# Patient Record
Sex: FEMALE | Race: WHITE | NOT HISPANIC OR LATINO
[De-identification: names, ages, dates, MRNs, and addresses within clinical notes are randomized per-mention and may not be internally consistent; named-entity substitution may affect disease eponyms.]

---

## 2018-05-21 ENCOUNTER — RESULT REVIEW (OUTPATIENT)
Age: 83
End: 2018-05-21

## 2018-06-22 ENCOUNTER — RESULT REVIEW (OUTPATIENT)
Age: 83
End: 2018-06-22

## 2018-11-28 PROBLEM — Z00.00 ENCOUNTER FOR PREVENTIVE HEALTH EXAMINATION: Status: ACTIVE | Noted: 2018-11-28

## 2018-12-07 ENCOUNTER — APPOINTMENT (OUTPATIENT)
Dept: PAIN MANAGEMENT | Facility: CLINIC | Age: 83
End: 2018-12-07

## 2019-08-27 ENCOUNTER — APPOINTMENT (OUTPATIENT)
Dept: NEUROLOGY | Facility: CLINIC | Age: 84
End: 2019-08-27

## 2019-09-16 ENCOUNTER — APPOINTMENT (OUTPATIENT)
Dept: GERIATRICS | Facility: CLINIC | Age: 84
End: 2019-09-16
Payer: MEDICARE

## 2019-09-16 VITALS — HEART RATE: 20 BPM | DIASTOLIC BLOOD PRESSURE: 70 MMHG | SYSTOLIC BLOOD PRESSURE: 120 MMHG

## 2019-09-16 DIAGNOSIS — I10 ESSENTIAL (PRIMARY) HYPERTENSION: ICD-10-CM

## 2019-09-16 DIAGNOSIS — M15.9 POLYOSTEOARTHRITIS, UNSPECIFIED: ICD-10-CM

## 2019-09-16 PROCEDURE — 99213 OFFICE O/P EST LOW 20 MIN: CPT

## 2019-09-16 NOTE — PHYSICAL EXAM
[General Appearance - Alert] : alert [General Appearance - In No Acute Distress] : in no acute distress [Skin Injury 1] : Skin injury: [___cm] : a [unfilled] cm [Location] : which was located [Lesions Elbows Right] : on the right elbow [Details] : the wound [Tender] : tenderness [FreeTextEntry1] : large skin tear with some bunching  [FreeTextEntry2] : scabbing along wound bed

## 2019-09-16 NOTE — ASSESSMENT
[FreeTextEntry1] : Cleansed, applied xeroform, bacitracin and covered with dpd\par Pt may leave this on for 2 days and then do dressing changes indpendently\par

## 2019-09-17 ENCOUNTER — RECORD ABSTRACTING (OUTPATIENT)
Age: 84
End: 2019-09-17

## 2019-09-17 DIAGNOSIS — Z78.9 OTHER SPECIFIED HEALTH STATUS: ICD-10-CM

## 2019-10-15 ENCOUNTER — APPOINTMENT (OUTPATIENT)
Dept: GERIATRICS | Facility: CLINIC | Age: 84
End: 2019-10-15
Payer: MEDICARE

## 2019-10-15 PROCEDURE — G0008: CPT

## 2019-10-15 PROCEDURE — 90686 IIV4 VACC NO PRSV 0.5 ML IM: CPT

## 2019-11-15 ENCOUNTER — APPOINTMENT (OUTPATIENT)
Dept: GERIATRICS | Facility: CLINIC | Age: 84
End: 2019-11-15
Payer: MEDICARE

## 2019-11-15 VITALS — SYSTOLIC BLOOD PRESSURE: 145 MMHG | HEART RATE: 20 BPM | DIASTOLIC BLOOD PRESSURE: 86 MMHG | RESPIRATION RATE: 16 BRPM

## 2019-11-15 DIAGNOSIS — S51.001A UNSPECIFIED OPEN WOUND OF RIGHT ELBOW, INITIAL ENCOUNTER: ICD-10-CM

## 2019-11-15 PROCEDURE — 99213 OFFICE O/P EST LOW 20 MIN: CPT

## 2019-11-15 NOTE — PHYSICAL EXAM
[General Appearance - Alert] : alert [General Appearance - In No Acute Distress] : in no acute distress [Heart Rate And Rhythm] : heart rate was normal and rhythm regular [Apical Impulse] : the apical impulse was normal [FreeTextEntry1] : left ankle with swelling both lateral and medial. Able to move foot and bear weight. No erythema

## 2019-11-15 NOTE — HISTORY OF PRESENT ILLNESS
[FreeTextEntry1] : Pt c/o pain in right great toe\par Did go to nail salon about 2 weeks ago\par Also swelling of left ankle which is worse today\par she spent yesterday on her feet and\par had sprained her ankle several weeks ago\par

## 2019-11-15 NOTE — ASSESSMENT
[FreeTextEntry1] : Remove nail polish, Cleanse with soapy water, soak with epsum salt in warm water tid x 20mins\par may take tylenol for pain\par F/U with Dr. Marquez. Appt made for 11/19 in his office \par Wrapped pt's left ankle with ace wrap and also provided her with a tubigrip \par She can take this off at night and do this independently. She also should \par likely f/u with an orthopedist to ensure proper support while ankle is healing.\par Additionallyl can go to her pcp.\par Pt verbalized understanding

## 2020-08-31 ENCOUNTER — RESULT REVIEW (OUTPATIENT)
Age: 85
End: 2020-08-31

## 2020-09-03 ENCOUNTER — APPOINTMENT (OUTPATIENT)
Dept: FAMILY MEDICINE | Facility: ASSISTED LIVING FACILITY | Age: 85
End: 2020-09-03
Payer: MEDICARE

## 2020-09-28 ENCOUNTER — APPOINTMENT (OUTPATIENT)
Dept: NEUROLOGY | Facility: CLINIC | Age: 85
End: 2020-09-28
Payer: MEDICARE

## 2020-09-28 VITALS
HEIGHT: 62 IN | DIASTOLIC BLOOD PRESSURE: 81 MMHG | WEIGHT: 130 LBS | SYSTOLIC BLOOD PRESSURE: 146 MMHG | BODY MASS INDEX: 23.92 KG/M2 | HEART RATE: 61 BPM | TEMPERATURE: 96.5 F

## 2020-09-28 PROCEDURE — 99215 OFFICE O/P EST HI 40 MIN: CPT

## 2020-09-28 NOTE — PHYSICAL EXAM
[General Appearance - Alert] : alert [General Appearance - In No Acute Distress] : in no acute distress [General Appearance - Well Developed] : well developed [Affect] : the affect was normal [Mood] : the mood was normal [Person] : oriented to person [Short Term Intact] : short term memory intact [Naming Objects] : no difficulty naming common objects [Fluency] : fluency intact [Comprehension] : comprehension intact [Cranial Nerves Optic (II)] : visual acuity intact bilaterally,  visual fields full to confrontation, pupils equal round and reactive to light [Cranial Nerves Oculomotor (III)] : extraocular motion intact [Cranial Nerves Trigeminal (V)] : facial sensation intact symmetrically [Cranial Nerves Facial (VII)] : face symmetrical [Motor Tone] : muscle tone was normal in all four extremities [Motor Strength] : muscle strength was normal in all four extremities [Sensation Tactile Decrease] : light touch was intact [Abnormal Walk] : normal gait [0] : Patella left 0

## 2020-09-28 NOTE — ASSESSMENT
[FreeTextEntry1] : - stable physical exam, but daughter believes that cognitively she has not reverted back completely (focus/concentration problems, memory problem). recommend further speech/OT therapy\par - cont with aspirin 81mg daily\par - lipitor 80mg daily prescribed (started in hospital, stopped in rehab)\par - bp: slightly high today. pt recommended to get bp cuff for home. pt following up pmd tomorrow, further adjustment as needed by pmd\par - recommend cardiology follow up for event monitoring\par - follow up in 2 months.

## 2020-09-28 NOTE — REVIEW OF SYSTEMS
[Confused or Disoriented] : confusion [Decr. Concentrating Ability] : decreased concentrating ability [Repeating Questions] : no repeated questioning about recent events [Difficulty Writing] : no difficulty writing [Dizziness] : no dizziness [Fainting] : no fainting [Difficulty Walking] : no difficulty walking [Negative] : Constitutional [de-identified] : slight disorientation to time and place

## 2020-09-28 NOTE — HISTORY OF PRESENT ILLNESS
[FreeTextEntry1] : Pt is 84 yo RH F with hx of HTN, hypothyroidism, recent stroke, here with recent hospital follow up.\par \par Pt seen along with pt's daughter.\par \par Pt presented to [facility] on 8/31 for 5 day hx of slurred speech, decreased attentiveness. 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  Pt noted with mild aphasia and right side weakness which improved during hospitalization and then discharged with rehab.\par \par Pt since then has physically improved, but pt's daughter think that cognitive pt has had problems. For example, pt's daughter reports that she has difficulty using her ipad (which she had no problems with) or forget her appts.\par \par Otherwise, no HA, no blurry vision, no nausea.\par \par

## 2020-09-28 NOTE — REVIEW OF SYSTEMS
[Confused or Disoriented] : confusion [Decr. Concentrating Ability] : decreased concentrating ability [Repeating Questions] : no repeated questioning about recent events [Difficulty Writing] : no difficulty writing [Dizziness] : no dizziness [Fainting] : no fainting [Difficulty Walking] : no difficulty walking [Negative] : Constitutional [de-identified] : slight disorientation to time and place

## 2020-09-28 NOTE — HISTORY OF PRESENT ILLNESS
[FreeTextEntry1] : Pt is 86 yo RH F with hx of HTN, hypothyroidism, recent stroke, here with recent hospital follow up.\par \par Pt seen along with pt's daughter.\par \par Pt presented to [facility] on 8/31 for 5 day hx of slurred speech, decreased attentiveness. 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  Pt noted with mild aphasia and right side weakness which improved during hospitalization and then discharged with rehab.\par \par Pt since then has physically improved, but pt's daughter think that cognitive pt has had problems. For example, pt's daughter reports that she has difficulty using her ipad (which she had no problems with) or forget her appts.\par \par Otherwise, no HA, no blurry vision, no nausea.\par \par

## 2020-09-30 NOTE — REASON FOR VISIT
[Consultation] : a consultation visit [Follow-Up: _____] : a [unfilled] follow-up visit [Family Member] : family member [FreeTextEntry1] : HOSPITAL FOLLOW UP

## 2021-05-18 ENCOUNTER — APPOINTMENT (OUTPATIENT)
Dept: GERIATRICS | Facility: CLINIC | Age: 86
End: 2021-05-18
Payer: MEDICARE

## 2021-05-18 VITALS
DIASTOLIC BLOOD PRESSURE: 72 MMHG | RESPIRATION RATE: 18 BRPM | OXYGEN SATURATION: 97 % | SYSTOLIC BLOOD PRESSURE: 120 MMHG | TEMPERATURE: 97.1 F | HEART RATE: 67 BPM

## 2021-05-18 DIAGNOSIS — S93.402A SPRAIN OF UNSPECIFIED LIGAMENT OF LEFT ANKLE, INITIAL ENCOUNTER: ICD-10-CM

## 2021-05-18 DIAGNOSIS — Z23 ENCOUNTER FOR IMMUNIZATION: ICD-10-CM

## 2021-05-18 PROCEDURE — 99214 OFFICE O/P EST MOD 30 MIN: CPT

## 2021-05-18 RX ORDER — LINACLOTIDE 290 UG/1
290 CAPSULE, GELATIN COATED ORAL
Refills: 0 | Status: DISCONTINUED | COMMUNITY
End: 2021-05-18

## 2021-05-18 NOTE — PHYSICAL EXAM
[General Appearance - Alert] : alert [Extraocular Movements] : extraocular movements were intact [] : no respiratory distress [Respiration, Rhythm And Depth] : normal respiratory rhythm and effort [Auscultation Breath Sounds / Voice Sounds] : lungs were clear to auscultation bilaterally [Apical Impulse] : the apical impulse was normal [Heart Rate And Rhythm] : heart rate was normal and rhythm regular [Bowel Sounds] : normal bowel sounds [Abdomen Soft] : soft [Oriented To Time, Place, And Person] : oriented to person, place, and time [FreeTextEntry1] : walking with some imbalance noted, does not use cane or walker

## 2021-05-18 NOTE — ASSESSMENT
[FreeTextEntry1] : pt feeling a little "wobbly" and will need further eval\par will send to  ER for further eval and treatment.

## 2021-05-18 NOTE — HISTORY OF PRESENT ILLNESS
[FreeTextEntry1] : tripped on a stair on 5/16 and fell, hitting her right side forehead\par did not call for emergency help at the time.\par Now with "racccoon right eye" and "hurts all over"\par dtr asked her to come to [facility] and get seen today.

## 2021-09-17 ENCOUNTER — APPOINTMENT (OUTPATIENT)
Dept: PAIN MANAGEMENT | Facility: CLINIC | Age: 86
End: 2021-09-17
Payer: MEDICARE

## 2021-09-17 ENCOUNTER — NON-APPOINTMENT (OUTPATIENT)
Age: 86
End: 2021-09-17

## 2021-09-17 VITALS
DIASTOLIC BLOOD PRESSURE: 80 MMHG | WEIGHT: 132 LBS | HEIGHT: 62 IN | BODY MASS INDEX: 24.29 KG/M2 | TEMPERATURE: 98 F | SYSTOLIC BLOOD PRESSURE: 140 MMHG

## 2021-09-17 PROCEDURE — 99204 OFFICE O/P NEW MOD 45 MIN: CPT

## 2021-09-17 NOTE — ASSESSMENT
[FreeTextEntry1] : Patient with TIA last year, not on any anticoagulation - referral to neurology to re establish care\par \par Patient pending MRI Lumbar spine to evaluate for progression of lumbar spinal stenosis\par \par may consider intervention\par \par \par The above diagnosis and treatment plan is medically reasonable and necessary based on the patient encounter.\par \par There were no barriers to communication.\par Informed patient that I would be available for any additional questions.\par Patient was instructed to call with any worsening symptoms including severe pain, new numbness/weakness, or changes in the bowel/bladder function. \par \par \par Instructed patient to maintain pain diary to monitor pain level, mobility, and function.\par

## 2021-09-17 NOTE — PHYSICAL EXAM
[Normal muscle bulk without asymmetry] : normal muscle bulk without asymmetry [Facet Tenderness] : facet tenderness [Normal] : Normal affect [de-identified] : Constitutional: Normal, well developed, no acute distress\par Eyes: Symmetric, External structures \par Oropharynx: Lips normal, symmetric, no external lesions appreciated\par Respiratory: Non-labored breathing, no audible wheezes\par Cardiac: Pulse palpated, no tachycardia\par Vascular: No cyanosis appreciated, no edema in bilateral lower extremities\par GI: Nondistended, no jaundice appreciated\par Neurovascular: CN2-12 grossly intact, Alert and oriented\par MSK: Normal muscle bulk, 5/5 Motor strength B/L in LE\par \par

## 2021-09-17 NOTE — HISTORY OF PRESENT ILLNESS
[Back Pain] : back pain [___ mths] : [unfilled] month(s) ago [Constant] : constant [5] : a current pain level of 5/10 [4] : an average pain level of 4/10 [3] : a minimum pain level of 3/10 [10] : a maximum pain level of 10/10 [Sharp] : sharp [Laying] : laying [FreeTextEntry1] : HPI\par \par Ms. PROMISE RAMIREZ is a 86 year F with pmhx of TIA 8/31/2020 not on anticoagulation, presents with right lower back, buttock, posterolateral thigh.   Pain is so bad that patient finds it difficult to perform adls and ambulate. denies any worsening numbness, weakness, bowel/bladder dysfunction. \par \par \par Previous and current pain medications/doses/effects:\par \par na\par \par Previous Pain Treatments:\par \par exercises without improvement\par \par Previous Pain Injections:\par \par na\par \par Previous Diagnostic Studies/Images:\par \par CT LS 2019\par \par There is mild exaggeration of the lumbar lordosis. There is approximately 9 mm grade 2 anterolisthesis of L4 on L5. There is additionally 3 mm grade 1 \par retrolisthesis of L1 on L2 and 3 mm anterolisthesis of L5 on S1. The bones appear osteopenic. No acute fracture or subluxation is identified. A sclerotic focus \par is noted in the left iliac bone which is nonspecific, but nonaggressive in appearance, likely a bone island. The visualized paraspinal soft tissues appear \par grossly unremarkable. Vascular calcifications are noted. \par There are degenerative changes of the lumbar spine including moderate/severe disc space narrowing at T12-L1 and L5-S1 and mild to moderate disc space narrowing \par throughout the remaining lumbar spine. There is vacuum phenomena within several intervertebral discs. There is mild to moderate endplate osteophyte formation, \par most pronounced at T12-L1 and L5-S1. There is pseudarthrosis of the L1-L5 spinous processes. There is multilevel facet arthropathy, most pronounced at L4-5. \par L1-2: Grade 1 retrolisthesis of L1 on L2. Mild disc bulge with marginal osteophyte formation. No significant central canal stenosis. Moderate right foraminal \par stenosis and mild left foraminal stenosis. \par L1-2: Mild disc bulge. No significant central canal stenosis. Mild left foraminal stenosis and no significant right foraminal stenosis. \par L3-4: Mild disc bulge. Moderate facet/ligament hypertrophy. Mild central canal stenosis. Mild bilateral foraminal stenosis. \par L4-5: Grade 2 anterolisthesis of L4 on L5. Small central disc extrusion superimposed on mild disc bulge. Marked facet arthropathy. Mild central canal stenosis. \par Bilateral subarticular stenosis with probable encroachment upon the L5 nerve roots. No significant foraminal stenosis. \par L5-S1: Grade 1 anterolisthesis of L5 on S1. Mild disc bulge with marginal osteophyte foramen extending into the neural foramina. No significant central canal \par stenosis. Severe bilateral foraminal stenosis. \par IMPRESSION: \par Lumbar spondylosis and spondylolisthesis. Small central disc herniation at L4-5. Mild central canal stenosis at L3-4 and L4-5. Bilateral subarticular stenosis \par at L4-5 with probable encroachment upon the L5 nerve roots. Varying degrees of foraminal stenosis above, severe at L5-S1 bilaterally. \par \par  [FreeTextEntry2] : 9 [FreeTextEntry7] : Left buttock [FreeTextEntry3] : n/a

## 2021-10-29 ENCOUNTER — APPOINTMENT (OUTPATIENT)
Dept: PAIN MANAGEMENT | Facility: CLINIC | Age: 86
End: 2021-10-29
Payer: MEDICARE

## 2021-10-29 ENCOUNTER — NON-APPOINTMENT (OUTPATIENT)
Age: 86
End: 2021-10-29

## 2021-10-29 VITALS
BODY MASS INDEX: 24.29 KG/M2 | WEIGHT: 132 LBS | DIASTOLIC BLOOD PRESSURE: 76 MMHG | HEIGHT: 62 IN | TEMPERATURE: 98 F | SYSTOLIC BLOOD PRESSURE: 144 MMHG

## 2021-10-29 DIAGNOSIS — Z86.73 PERSONAL HISTORY OF TRANSIENT ISCHEMIC ATTACK (TIA), AND CEREBRAL INFARCTION W/OUT RESIDUAL DEFICITS: ICD-10-CM

## 2021-10-29 DIAGNOSIS — M79.18 MYALGIA, OTHER SITE: ICD-10-CM

## 2021-10-29 PROCEDURE — 99214 OFFICE O/P EST MOD 30 MIN: CPT

## 2021-10-29 NOTE — HISTORY OF PRESENT ILLNESS
[2] : 3. What number best describes how, during the past week, pain has interfered with your general activity? 2/10 pain [Back Pain] : back pain [___ mths] : [unfilled] month(s) ago [Constant] : constant [5] : a current pain level of 5/10 [4] : an average pain level of 4/10 [3] : a minimum pain level of 3/10 [10] : a maximum pain level of 10/10 [Sharp] : sharp [Laying] : laying [FreeTextEntry1] : Interval Note:\par \par Since last visit the pain is not improved. Continues of left lower back buttock and thigh.  Pain is so bad that patient finds it difficult to perform adls and ambulate. Denies any additional weakness, numbness, bowel/bladder dysfunction.  \par \par \par HPI\par \par Ms. PROMISE RAMIREZ is a 86 year F with pmhx of TIA 8/31/2020 not on anticoagulation, presents with left lower back, buttock, posterolateral thigh.   Pain is so bad that patient finds it difficult to perform adls and ambulate. denies any worsening numbness, weakness, bowel/bladder dysfunction. \par \par \par Previous and current pain medications/doses/effects:\par \par na\par \par Previous Pain Treatments:\par \par exercises without improvement\par \par Previous Pain Injections:\par \par na\par \par Previous Diagnostic Studies/Images:\par \par CT LS 2019\par \par There is mild exaggeration of the lumbar lordosis. There is approximately 9 mm grade 2 anterolisthesis of L4 on L5. There is additionally 3 mm grade 1 \par retrolisthesis of L1 on L2 and 3 mm anterolisthesis of L5 on S1. The bones appear osteopenic. No acute fracture or subluxation is identified. A sclerotic focus \par is noted in the left iliac bone which is nonspecific, but nonaggressive in appearance, likely a bone island. The visualized paraspinal soft tissues appear \par grossly unremarkable. Vascular calcifications are noted. \par There are degenerative changes of the lumbar spine including moderate/severe disc space narrowing at T12-L1 and L5-S1 and mild to moderate disc space narrowing \par throughout the remaining lumbar spine. There is vacuum phenomena within several intervertebral discs. There is mild to moderate endplate osteophyte formation, \par most pronounced at T12-L1 and L5-S1. There is pseudarthrosis of the L1-L5 spinous processes. There is multilevel facet arthropathy, most pronounced at L4-5. \par L1-2: Grade 1 retrolisthesis of L1 on L2. Mild disc bulge with marginal osteophyte formation. No significant central canal stenosis. Moderate right foraminal \par stenosis and mild left foraminal stenosis. \par L1-2: Mild disc bulge. No significant central canal stenosis. Mild left foraminal stenosis and no significant right foraminal stenosis. \par L3-4: Mild disc bulge. Moderate facet/ligament hypertrophy. Mild central canal stenosis. Mild bilateral foraminal stenosis. \par L4-5: Grade 2 anterolisthesis of L4 on L5. Small central disc extrusion superimposed on mild disc bulge. Marked facet arthropathy. Mild central canal stenosis. \par Bilateral subarticular stenosis with probable encroachment upon the L5 nerve roots. No significant foraminal stenosis. \par L5-S1: Grade 1 anterolisthesis of L5 on S1. Mild disc bulge with marginal osteophyte foramen extending into the neural foramina. No significant central canal \par stenosis. Severe bilateral foraminal stenosis. \par IMPRESSION: \par Lumbar spondylosis and spondylolisthesis. Small central disc herniation at L4-5. Mild central canal stenosis at L3-4 and L4-5. Bilateral subarticular stenosis \par at L4-5 with probable encroachment upon the L5 nerve roots. Varying degrees of foraminal stenosis above, severe at L5-S1 bilaterally. \par \par  [FreeTextEntry2] : 6 [FreeTextEntry7] : Left buttock [FreeTextEntry3] : n/a

## 2021-10-29 NOTE — ASSESSMENT
[FreeTextEntry1] : >> Imaging and Other Studies\par \par back and leg pain likely secondary to lumbar radiculopathy and discogenic pain refractory to conservative treatments including 6 consecutive weeks of home exercises/PT, will obtain MRI LS to evaluate for pathology\par \par may consider PT vs intervention pending eval\par \par may consider intervention\par \par >> Therapy and Other Modalities\par \par continue PT\par \par >> Medications\par  \par continue current regimen\par \par >> Interventions\par \par na\par \par >> Consults\par \par Patient with TIA last year, not on any anticoagulation - referral to neurology to re establish care\par \par >> Discussion of Risks/Benefits/Alternatives\par \par 	>Regarding any scheduled procedures:\par \par I have discussed in detail with the patient that any interventional pain procedure is associated with potential risks.  The procedure may include an injection of steroids and potentially other medications (local anesthetic and normal saline) into the epidural space or surrounding tissue of the spine.  There are significant risks of this procedure which include and are not limited to infection, bleeding, worsening pain, dural puncture leading to postdural puncture headache, nerve damage, spinal cord injury, paralysis, stroke, and death.  \par \par There is a chance that the procedure does not improve their pain.  \par \par There are risks associated with the steroid being absorbed into the body systemically.  These include dysphoria, difficulty sleeping, mood swings and personality changes.  Premenopausal women may notice an irregularity in her menstrual cycle for 2-3 months following the injection.  Steroids can specifically affect patients with hypertension, diabetes, and peptic ulcers.  The procedure may cause a temporary increase in blood pressure and blood pressure, and may adversely affect a peptic ulcer.  Other, more rare complications, include avascular necrosis of joints, glaucoma and worsening of osteoporosis. \par \par I have discussed the risks of the procedure at length with the patient, and the potential benefits of pain relief.  I have offered alternatives to the procedure.  All questions were answered.  \par \par The patient expressed understanding and wishes to proceed with the procedure.\par \par 	>Regarding COVID19 Pandemic: \par \par Any planned interventional pain procedure are scheduled because further delay may cause harm or negative outcome to patient.  The goal in performing this procedure is to avoid deterioration of function, emergency room visits (which increases exposure) and reliance on opioids.  \par \par r/b/a discussed with patient, lack of evidence to conclusively determine whether pain management procedures have any positive or negative impact on the possibility of mayra the virus and/or development of any sequelae. \par \par Patient counselled regarding timing steroid based intervention 2 weeks before or after COVID-19 vaccine administration to avoid any interaction or affect on efficacy of vaccination\par \par Patient demonstrates understanding\par \par Informed patient that risks associated with the COVID-19 infection.  Informed patient steps taken to limit the risks.  We are implementing safety precautions and following protocols consistent with the CDC and state recommendations. All patients and staff will be checked for fever or signs of illness upon entry to the facility. We will limit our steroid dose to the lowest effective therapeutic dose or in some cases steroids will not be injected at all. \par \par Patient agrees to proceed\par \par >> Conclusion\par \par The above diagnosis and treatment plan is medically reasonable and necessary based on the patient encounter \par There were no barriers to communication.\par Informed patient that I would be available for any additional questions.\par Patient was instructed to call with any worsening symptoms including severe pain, new numbness/weakness, or changes in the bowel/bladder function. \par Discussed role of nsaids in pain management and all relevant risks, if patient is continuing to require after 4 weeks the patient should f/u for alternative treatment. \par Instructed patient to maintain pain diary to monitor pain level, mobility, and function.\par \par \par

## 2021-11-01 ENCOUNTER — APPOINTMENT (OUTPATIENT)
Dept: GERIATRICS | Facility: CLINIC | Age: 86
End: 2021-11-01
Payer: MEDICARE

## 2021-11-01 VITALS
HEIGHT: 62 IN | OXYGEN SATURATION: 99 % | WEIGHT: 133 LBS | SYSTOLIC BLOOD PRESSURE: 128 MMHG | TEMPERATURE: 98 F | DIASTOLIC BLOOD PRESSURE: 62 MMHG | HEART RATE: 72 BPM | BODY MASS INDEX: 24.48 KG/M2

## 2021-11-01 DIAGNOSIS — Z83.3 FAMILY HISTORY OF DIABETES MELLITUS: ICD-10-CM

## 2021-11-01 DIAGNOSIS — G47.00 INSOMNIA, UNSPECIFIED: ICD-10-CM

## 2021-11-01 PROCEDURE — 99205 OFFICE O/P NEW HI 60 MIN: CPT

## 2021-11-01 RX ORDER — NITROFURANTOIN (MONOHYDRATE/MACROCRYSTALS) 25; 75 MG/1; MG/1
100 CAPSULE ORAL
Qty: 14 | Refills: 0 | Status: COMPLETED | COMMUNITY
Start: 2021-08-03 | End: 2021-11-01

## 2021-11-01 RX ORDER — CEFUROXIME AXETIL 500 MG/1
500 TABLET ORAL
Qty: 10 | Refills: 0 | Status: COMPLETED | COMMUNITY
Start: 2021-07-15 | End: 2021-11-01

## 2021-11-01 NOTE — ASSESSMENT
[FreeTextEntry1] : will check labs at [facility] this wednesday\par both  and wife have memory deficits\par wrote down all instruction\par she will see me this friday at [facility] and bring all medication bottles with her so I can review\par and confirm she is on ASA and statin\par she is in charge of her own medications which is a bit shocking but \par will obtain records from her PCP\par and goal is to keep her safe in [place] for as long as possible\par also consider MOLST and MOCA in near future

## 2021-11-01 NOTE — PHYSICAL EXAM
[Alert] : alert [Well Nourished] : well nourished [No Acute Distress] : in no acute distress [Well Developed] : well developed [Sclera] : the sclera and conjunctiva were normal [EOMI] : extraocular movements were intact [PERRL] : pupils were equal in size, round, and reactive to light [Normal Oral Mucosa] : normal oral mucosa [No Oral Pallor] : no oral pallor [Normal Appearance] : the appearance of the neck was normal [No Neck Mass] : no neck mass was observed [Supple] : the neck was supple [No Respiratory Distress] : no respiratory distress [No Acc Muscle Use] : no accessory muscle use [Respiration, Rhythm And Depth] : normal respiratory rhythm and effort [Auscultation Breath Sounds / Voice Sounds] : lungs were clear to auscultation bilaterally [Normal S1, S2] : normal S1 and S2 [Heart Rate And Rhythm] : heart rate was normal and rhythm regular [Heart Sounds Gallop] : no gallops [No Rubs] : no pericardial rub [Bowel Sounds] : normal bowel sounds [Abdomen Tenderness] : non-tender [Abdomen Soft] : soft [Cervical Lymph Nodes Enlarged Posterior Bilaterally] : posterior cervical [Supraclavicular Lymph Nodes Enlarged Bilaterally] : supraclavicular [Cervical Lymph Nodes Enlarged Anterior Bilaterally] : anterior cervical, supraclavicular [No CVA Tenderness] : no CVA  tenderness [No Spinal Tenderness] : no spinal tenderness [No Clubbing, Cyanosis] : no clubbing or cyanosis of the fingernails [Involuntary Movements] : no involuntary movements were seen [Motor Tone] : muscle strength and tone were normal [Normal Color / Pigmentation] : normal skin color and pigmentation [] : no rash [Normal Turgor] : normal skin turgor [Oriented To Time, Place, And Person] : oriented to person, place, and time

## 2021-11-01 NOTE — REVIEW OF SYSTEMS
[Loss Of Hearing] : hearing loss [Arthralgias] : arthralgias [Fever] : no fever [Chills] : no chills [Feeling Poorly] : not feeling poorly [Feeling Tired] : not feeling tired [Eyesight Problems] : no eyesight problems [Discharge From Eyes] : no purulent discharge from the eyes [Earache] : no earache [Chest Pain] : no chest pain [Palpitations] : no palpitations [Cough] : no cough [SOB on Exertion] : no shortness of breath during exertion [Constipation] : no constipation [Diarrhea] : no diarrhea [Dysuria] : no dysuria [Incontinence] : no incontinence [Skin Lesions] : no skin lesions [Skin Wound] : no skin wound [Dizziness] : no dizziness [Fainting] : no fainting [Anxiety] : no anxiety [Depression] : no depression

## 2021-11-01 NOTE — HISTORY OF PRESENT ILLNESS
[No falls in past year] : Patient reported no falls in the past year [Completely Independent] : Completely independent. [Smoke Detector] : smoke detector [Carbon Monoxide Detector] : carbon monoxide detector [Stair Lift] : stair lift used in home [Grab Bars] : grab bars [Shower Chair] : shower chair [Night Light] : night light [Anti-Slip Measures] : anti-slip measures [Wears Seat Belt] : wears seat belt [0] : 2) Feeling down, depressed, or hopeless: Not at all (0) [PHQ-2 Negative - No further assessment needed] : PHQ-2 Negative - No further assessment needed [FreeTextEntry1] : 86 year old female PMH HTN, hypothyroidism, CVA 2020 presenting to [facility] care.\par \par PCP was Dr. Wilber Cabrera.  \par \par Daughter Jojo in [place] \par \par Pt presented to [facility] on 8/31 for 5 day hx of slurred speech, decreased attentiveness. 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 Pt noted with mild aphasia and right side weakness which improved during hospitalization and then discharged with rehab.\par \par Memory is poor since that time.  She is also having L sided gluteal pain and issues walking.  Now to start PT at [facility] MRI has been ordered.  \par \par She cannot recall medications.  Cannot remember if she is on statin s/p CVA?!? [Driving Concerns] : not driving or driving without noted concerns [[id number]] : 0

## 2021-11-05 ENCOUNTER — APPOINTMENT (OUTPATIENT)
Dept: GERIATRICS | Facility: CLINIC | Age: 86
End: 2021-11-05
Payer: MEDICARE

## 2021-11-05 VITALS
DIASTOLIC BLOOD PRESSURE: 60 MMHG | HEART RATE: 70 BPM | SYSTOLIC BLOOD PRESSURE: 120 MMHG | RESPIRATION RATE: 16 BRPM | HEIGHT: 62 IN | TEMPERATURE: 98 F

## 2021-11-05 DIAGNOSIS — M79.2 NEURALGIA AND NEURITIS, UNSPECIFIED: ICD-10-CM

## 2021-11-05 DIAGNOSIS — E55.9 VITAMIN D DEFICIENCY, UNSPECIFIED: ICD-10-CM

## 2021-11-05 PROCEDURE — 99212 OFFICE O/P EST SF 10 MIN: CPT

## 2021-11-05 RX ORDER — NIFEDIPINE 20 MG/1
20 CAPSULE ORAL
Refills: 0 | Status: COMPLETED | COMMUNITY
End: 2021-11-05

## 2021-11-05 RX ORDER — FLAXSEED
POWDER (GRAM) ORAL
Refills: 0 | Status: COMPLETED | COMMUNITY
End: 2021-11-05

## 2021-11-05 RX ORDER — MAGNESIUM OXIDE 420 MG
420 (252 MG) TABLET ORAL
Qty: 100 | Refills: 0 | Status: COMPLETED | COMMUNITY
Start: 2021-07-05 | End: 2021-11-05

## 2021-11-05 NOTE — HISTORY OF PRESENT ILLNESS
[No falls in past year] : Patient reported no falls in the past year [Completely Independent] : Completely independent. [0] : 2) Feeling down, depressed, or hopeless: Not at all (0) [PHQ-2 Negative - No further assessment needed] : PHQ-2 Negative - No further assessment needed [Moderate] : Stage: Moderate [Stable] : Status: Stable [Memory Lapses Or Loss] : stable memory impairment [FreeTextEntry1] : 86 year old female PMH HTN, hypothyroidism, CVA 2020 presenting to [facility] care.\par \par PCP was Dr. Wilber Cabrera.  \par \par Daughter Jojo in [place] \par \par Pt presented to [facility] on 8/31 for 5 day hx of slurred speech, decreased attentiveness. 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 Pt noted with mild aphasia and right side weakness which improved during hospitalization and then discharged with rehab.\par \par Memory is poor since that time.  She is also having L sided gluteal pain and issues walking.  Now to start PT at [facility] MRI has been ordered.  \par \par Brought in all medications. [Smoke Detector] : no smoke detector [Carbon Monoxide Detector] : no carbon monoxide detector [[id number]] : 0

## 2021-11-05 NOTE — ASSESSMENT
[FreeTextEntry1] : labs pending\par all meds reviewed\par NOT ON STATIN?\par wait for labs to see what is going on\par will need to discuss with daughter but wait for labs\par goal is memory testing to establish baseline\par suspect substantial cognitive impairment but stable\par and blood work to confirm she is taking medications\par \par and goal is [place] as long [place] possible\par also consider MOLST and MOCA in near future

## 2021-11-05 NOTE — PHYSICAL EXAM
[Alert] : alert [Well Nourished] : well nourished [Well Developed] : well developed [Sclera] : the sclera and conjunctiva were normal [EOMI] : extraocular movements were intact [PERRL] : pupils were equal in size, round, and reactive to light [Normal Oral Mucosa] : normal oral mucosa [No Oral Pallor] : no oral pallor [Normal Appearance] : the appearance of the neck was normal [No Neck Mass] : no neck mass was observed [Supple] : the neck was supple [No Respiratory Distress] : no respiratory distress [No Acc Muscle Use] : no accessory muscle use [Respiration, Rhythm And Depth] : normal respiratory rhythm and effort [Auscultation Breath Sounds / Voice Sounds] : lungs were clear to auscultation bilaterally [Normal S1, S2] : normal S1 and S2 [Heart Rate And Rhythm] : heart rate was normal and rhythm regular [Heart Sounds Gallop] : no gallops [No Rubs] : no pericardial rub [Bowel Sounds] : normal bowel sounds [Abdomen Tenderness] : non-tender [Abdomen Soft] : soft [Cervical Lymph Nodes Enlarged Posterior Bilaterally] : posterior cervical [Supraclavicular Lymph Nodes Enlarged Bilaterally] : supraclavicular [No CVA Tenderness] : no CVA  tenderness [Cervical Lymph Nodes Enlarged Anterior Bilaterally] : anterior cervical, supraclavicular [No Spinal Tenderness] : no spinal tenderness [No Clubbing, Cyanosis] : no clubbing or cyanosis of the fingernails [Involuntary Movements] : no involuntary movements were seen [Motor Tone] : muscle strength and tone were normal [Normal Color / Pigmentation] : normal skin color and pigmentation [] : no rash [Normal Turgor] : normal skin turgor [Oriented To Time, Place, And Person] : oriented to person, place, and time

## 2021-11-18 DIAGNOSIS — E53.8 DEFICIENCY OF OTHER SPECIFIED B GROUP VITAMINS: ICD-10-CM

## 2021-11-30 ENCOUNTER — APPOINTMENT (OUTPATIENT)
Dept: GERIATRICS | Facility: CLINIC | Age: 86
End: 2021-11-30
Payer: MEDICARE

## 2021-11-30 VITALS
RESPIRATION RATE: 20 BRPM | DIASTOLIC BLOOD PRESSURE: 70 MMHG | TEMPERATURE: 98.4 F | SYSTOLIC BLOOD PRESSURE: 120 MMHG | OXYGEN SATURATION: 98 % | HEART RATE: 78 BPM

## 2021-11-30 DIAGNOSIS — S00.83XA CONTUSION OF OTHER PART OF HEAD, INITIAL ENCOUNTER: ICD-10-CM

## 2021-11-30 DIAGNOSIS — J02.9 ACUTE PHARYNGITIS, UNSPECIFIED: ICD-10-CM
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
## 2021-11-30 DIAGNOSIS — S05.11XA CONTUSION OF EYEBALL AND ORBITAL TISSUES, RIGHT EYE, INITIAL ENCOUNTER: ICD-10-CM

## 2021-11-30 PROCEDURE — 99213 OFFICE O/P EST LOW 20 MIN: CPT | Mod: CS

## 2021-11-30 NOTE — ASSESSMENT
[FreeTextEntry1] : Pt swabbed for PCR using all PPE as per protocol.\par sample delivered to [facility] on [address] in [place].\par \par Building management aware\par \par

## 2021-11-30 NOTE — HISTORY OF PRESENT ILLNESS
[FreeTextEntry1] : Pt was said to have prior cold symptoms while out with her family at [facility]\par Her daughter tested positive for covid and her  is coughing and\par severely fatigued.\par

## 2022-02-02 ENCOUNTER — LABORATORY RESULT (OUTPATIENT)
Age: 87
End: 2022-02-02

## 2022-02-11 ENCOUNTER — APPOINTMENT (OUTPATIENT)
Dept: GERIATRICS | Facility: ASSISTED LIVING FACILITY | Age: 87
End: 2022-02-11
Payer: MEDICARE

## 2022-02-11 DIAGNOSIS — M54.42 LUMBAGO WITH SCIATICA, LEFT SIDE: ICD-10-CM

## 2022-02-11 PROCEDURE — 99214 OFFICE O/P EST MOD 30 MIN: CPT

## 2022-02-14 VITALS
HEART RATE: 90 BPM | SYSTOLIC BLOOD PRESSURE: 120 MMHG | TEMPERATURE: 98 F | RESPIRATION RATE: 18 BRPM | DIASTOLIC BLOOD PRESSURE: 70 MMHG

## 2022-02-14 PROBLEM — M54.42 LUMBAGO WITH SCIATICA, LEFT SIDE: Status: ACTIVE | Noted: 2022-02-11

## 2022-02-14 NOTE — ASSESSMENT
[FreeTextEntry1] : refer to PT at [facility]\par spoke with daughter Jojo\par she will put tylenol 1000mg in pill box standing for evening\par if no improvement can consider xray and or pain management\par reviewed labs as well \par \par Daughter Jojo in [place] \par \par \par

## 2022-02-14 NOTE — HISTORY OF PRESENT ILLNESS
[No falls in past year] : Patient reported no falls in the past year [Completely Independent] : Completely independent. 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 [0] : 2) Feeling down, depressed, or hopeless: Not at all (0) [PHQ-2 Negative - No further assessment needed] : PHQ-2 Negative - No further assessment needed [FreeTextEntry1] : 86 year old female PMH HTN, hypothyroidism, CVA 2020 presenting to [facility] care.\par PCP was Dr. Wilber Cabrera. \par \par Pt presented to [facility] on 8/31 for 5 day hx of slurred speech, decreased attentiveness. 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 Pt noted with mild aphasia and right side weakness which improved during hospitalization and then discharged with rehab.\par \par Memory is poor since that time. She is also having L sided gluteal pain and issues walking. \par \par \par update\par having back pain for several months\par went on her own to see chriopractor but has not followed up\par discussion about xray but she never followed up\par I called her daughter Jojo to update \par she is not taking any OTC pain meds\par shooting pains worse in late evening\par \par Daughter Jojo in [place]  [[id number]] : 0

## 2022-02-14 NOTE — PHYSICAL EXAM
[Alert] : alert [Normal Oral Mucosa] : normal oral mucosa [Oropharynx] : the oropharynx was normal [Normal Appearance] : the appearance of the neck was normal [Supple] : the neck was supple [No Respiratory Distress] : no respiratory distress [No Acc Muscle Use] : no accessory muscle use [Respiration, Rhythm And Depth] : normal respiratory rhythm and effort [Auscultation Breath Sounds / Voice Sounds] : lungs were clear to auscultation bilaterally [Heart Rate And Rhythm] : heart rate was normal and rhythm regular [No Spinal Tenderness] : no spinal tenderness [Normal Affect] : the affect was normal [Normal Mood] : the mood was normal

## 2022-03-01 ENCOUNTER — RX RENEWAL (OUTPATIENT)
Age: 87
End: 2022-03-01

## 2022-03-30 ENCOUNTER — APPOINTMENT (OUTPATIENT)
Dept: GERIATRICS | Facility: CLINIC | Age: 87
End: 2022-03-30
Payer: MEDICARE

## 2022-03-30 VITALS — HEART RATE: 80 BPM | SYSTOLIC BLOOD PRESSURE: 120 MMHG | RESPIRATION RATE: 18 BRPM | DIASTOLIC BLOOD PRESSURE: 70 MMHG

## 2022-03-30 DIAGNOSIS — M54.50 LOW BACK PAIN, UNSPECIFIED: ICD-10-CM

## 2022-03-30 PROCEDURE — 99215 OFFICE O/P EST HI 40 MIN: CPT

## 2022-03-30 RX ORDER — CALCIUM CARBONATE 300MG(750)
1000 TABLET,CHEWABLE ORAL
Qty: 90 | Refills: 3 | Status: COMPLETED | COMMUNITY
Start: 2021-11-18 | End: 2022-03-30

## 2022-03-30 NOTE — ASSESSMENT
[FreeTextEntry1] : back pain is stable\par does not recall any more sharp shooting pain\par continue with PT\par spoke with daughter Jojo\par will check labs in June prior to appointment to assure thyroid med is adequate and for medication compliance\par given her memory loss\par off of B12 \par next visit will need to discuss vaccines etc\par \par \par Daughter Jojo in [place] \par \par \par home phcy is [facility]

## 2022-03-30 NOTE — HISTORY OF PRESENT ILLNESS
[No falls in past year] : Patient reported no falls in the past year [Completely Independent] : Completely independent. [FreeTextEntry1] : having back pain for several months\par last visit opted to have PT at [facility]\par back pain is improving\par I called her daughter Jojo to update \par stopped B12\par

## 2022-03-30 NOTE — PHYSICAL EXAM
[Alert] : alert [Well Nourished] : well nourished [Well Developed] : well developed [Sclera] : the sclera and conjunctiva were normal [EOMI] : extraocular movements were intact [PERRL] : pupils were equal in size, round, and reactive to light [Normal Oral Mucosa] : normal oral mucosa [No Oral Pallor] : no oral pallor [Normal Appearance] : the appearance of the neck was normal [No Neck Mass] : no neck mass was observed [Supple] : the neck was supple [No Respiratory Distress] : no respiratory distress [No Acc Muscle Use] : no accessory muscle use [Respiration, Rhythm And Depth] : normal respiratory rhythm and effort [Auscultation Breath Sounds / Voice Sounds] : lungs were clear to auscultation bilaterally [Normal S1, S2] : normal S1 and S2 [Heart Rate And Rhythm] : heart rate was normal and rhythm regular [Heart Sounds Gallop] : no gallops [No Rubs] : no pericardial rub [Bowel Sounds] : normal bowel sounds [Abdomen Tenderness] : non-tender [Abdomen Soft] : soft [Cervical Lymph Nodes Enlarged Posterior Bilaterally] : posterior cervical [Supraclavicular Lymph Nodes Enlarged Bilaterally] : supraclavicular [Cervical Lymph Nodes Enlarged Anterior Bilaterally] : anterior cervical, supraclavicular [No CVA Tenderness] : no CVA  tenderness [No Spinal Tenderness] : no spinal tenderness [No Clubbing, Cyanosis] : no clubbing or cyanosis of the fingernails [Involuntary Movements] : no involuntary movements were seen [Motor Tone] : muscle strength and tone were normal [Normal Color / Pigmentation] : normal skin color and pigmentation [] : no rash [Normal Turgor] : normal skin turgor [Oriented To Time, Place, And Person] : oriented to person, place, and time

## 2022-03-30 NOTE — REVIEW OF SYSTEMS
[Chills] : no chills [Fever] : no fever [Feeling Poorly] : not feeling poorly [Feeling Tired] : not feeling tired [Eyesight Problems] : no eyesight problems [Discharge From Eyes] : no purulent discharge from the eyes [Earache] : no earache [Loss Of Hearing] : hearing loss [Chest Pain] : no chest pain [Palpitations] : no palpitations [Cough] : no cough [SOB on Exertion] : no shortness of breath during exertion [Constipation] : no constipation [Diarrhea] : no diarrhea [Dysuria] : no dysuria [Incontinence] : no incontinence [Arthralgias] : arthralgias [Skin Lesions] : no skin lesions [Skin Wound] : no skin wound [Dizziness] : no dizziness [Fainting] : no fainting [Anxiety] : no anxiety [Depression] : no depression

## 2022-04-25 ENCOUNTER — APPOINTMENT (OUTPATIENT)
Dept: GERIATRICS | Facility: CLINIC | Age: 87
End: 2022-04-25
Payer: MEDICARE

## 2022-04-25 VITALS
RESPIRATION RATE: 20 BRPM | SYSTOLIC BLOOD PRESSURE: 102 MMHG | DIASTOLIC BLOOD PRESSURE: 60 MMHG | TEMPERATURE: 97.1 F | HEART RATE: 60 BPM

## 2022-04-25 PROCEDURE — 99213 OFFICE O/P EST LOW 20 MIN: CPT

## 2022-04-25 RX ORDER — BACITRACIN 500 [IU]/G
500 OINTMENT TOPICAL 3 TIMES DAILY
Qty: 1 | Refills: 0 | Status: COMPLETED | OUTPATIENT
Start: 2022-04-25 | End: 2022-04-28

## 2022-04-25 NOTE — HISTORY OF PRESENT ILLNESS
[FreeTextEntry1] : pain in right great toe for 2-3 weeks after getting a pedicure\par comes and goes

## 2022-04-25 NOTE — ASSESSMENT
[FreeTextEntry1] : Applied bacitracin and bandaid\par assisted pt to make podiatry appt with Dr. Marquez for next available\par she does not want to go to go to his office for a sooner appt\par writer will see her next week to ensure no infection

## 2022-04-25 NOTE — PHYSICAL EXAM
[Alert] : alert [No Acute Distress] : in no acute distress [Sclera] : the sclera and conjunctiva were normal [Normal Outer Ear/Nose] : the ears and nose were normal in appearance [Normal Appearance] : the appearance of the neck was normal [Supple] : the neck was supple [No Respiratory Distress] : no respiratory distress [Heart Rate And Rhythm] : heart rate was normal and rhythm regular [No Masses] : no abdominal mass palpated [No Spinal Tenderness] : no spinal tenderness [No Focal Deficits] : no focal deficits [de-identified] : right great toe with tenderness along medial edge of nail, mild erythema, no edema, no warmth

## 2022-06-15 ENCOUNTER — RX RENEWAL (OUTPATIENT)
Age: 87
End: 2022-06-15

## 2022-06-22 ENCOUNTER — LABORATORY RESULT (OUTPATIENT)
Age: 87
End: 2022-06-22

## 2022-06-27 ENCOUNTER — APPOINTMENT (OUTPATIENT)
Dept: GERIATRICS | Facility: CLINIC | Age: 87
End: 2022-06-27

## 2022-06-27 VITALS
HEART RATE: 70 BPM | SYSTOLIC BLOOD PRESSURE: 120 MMHG | RESPIRATION RATE: 20 BRPM | DIASTOLIC BLOOD PRESSURE: 82 MMHG | TEMPERATURE: 97 F

## 2022-06-27 PROCEDURE — 99213 OFFICE O/P EST LOW 20 MIN: CPT

## 2022-06-27 RX ORDER — NIFEDIPINE 30 MG/1
30 TABLET, EXTENDED RELEASE ORAL
Qty: 90 | Refills: 0 | Status: DISCONTINUED | COMMUNITY
Start: 2022-03-15

## 2022-06-27 NOTE — PHYSICAL EXAM
[Normal] : the sclera and conjunctiva were normal, extraocular movements were intact, pupils were equal in size, round, and reactive to light [Normal Outer Ear/Nose] : the ears and nose were normal in appearance [No Respiratory Distress] : no respiratory distress [Respiration, Rhythm And Depth] : normal respiratory rhythm and effort [Normal PMI] : the apical impulse was abnormal [Heart Rate And Rhythm] : heart rate was normal and rhythm regular [Normal Gait] : normal gait [Motor Tone] : muscle strength and tone were normal [de-identified] : left foot with FROM, no bruising no open skin [de-identified] : right great toenail with mild redness along medial side of nail

## 2022-06-27 NOTE — ASSESSMENT
[FreeTextEntry1] : wrapped foot in an ace wrap which pt stated helped \par She can use ace wrap qam and remove qhs if helpful\par gentle stretching exercises and rest\par \par For toenail - please apply bacitrican and bandaid to \par prevent infection\par \par F/U with Dr. Marquez on 6/30

## 2022-06-27 NOTE — HISTORY OF PRESENT ILLNESS
[FreeTextEntry1] : Has had pain in right great toe for about 2 months\par due to toenail / pedicure\par Did see Dr. Marquez who is treating it\par \par Also this morning woke with pain in dorsum center\par of left foot/ankle\par Able to bear weight and walk/ no change in gait\par Denies trauma

## 2022-07-06 ENCOUNTER — APPOINTMENT (OUTPATIENT)
Dept: GERIATRICS | Facility: CLINIC | Age: 87
End: 2022-07-06

## 2022-08-15 ENCOUNTER — RESULT REVIEW (OUTPATIENT)
Age: 87
End: 2022-08-15

## 2022-08-15 ENCOUNTER — APPOINTMENT (OUTPATIENT)
Dept: GERIATRICS | Facility: CLINIC | Age: 87
End: 2022-08-15

## 2022-08-15 ENCOUNTER — NON-APPOINTMENT (OUTPATIENT)
Age: 87
End: 2022-08-15

## 2022-08-15 VITALS
TEMPERATURE: 97 F | OXYGEN SATURATION: 97 % | DIASTOLIC BLOOD PRESSURE: 80 MMHG | HEART RATE: 70 BPM | SYSTOLIC BLOOD PRESSURE: 140 MMHG | RESPIRATION RATE: 18 BRPM

## 2022-08-15 DIAGNOSIS — Z20.822 CONTACT WITH AND (SUSPECTED) EXPOSURE TO COVID-19: ICD-10-CM

## 2022-08-15 DIAGNOSIS — Z87.898 PERSONAL HISTORY OF OTHER SPECIFIED CONDITIONS: ICD-10-CM

## 2022-08-15 DIAGNOSIS — M19.072 PRIMARY OSTEOARTHRITIS, LEFT ANKLE AND FOOT: ICD-10-CM

## 2022-08-15 DIAGNOSIS — K59.00 CONSTIPATION, UNSPECIFIED: ICD-10-CM

## 2022-08-15 DIAGNOSIS — R79.0 ABNORMAL LVL OF BLOOD MINERAL: ICD-10-CM

## 2022-08-15 PROCEDURE — 99214 OFFICE O/P EST MOD 30 MIN: CPT

## 2022-08-16 ENCOUNTER — RESULT REVIEW (OUTPATIENT)
Age: 87
End: 2022-08-16

## 2022-08-16 PROBLEM — M19.072 ARTHRITIS OF FOOT, LEFT: Status: RESOLVED | Noted: 2022-06-27 | Resolved: 2022-08-16

## 2022-08-16 PROBLEM — K59.00 CONSTIPATION, UNSPECIFIED CONSTIPATION TYPE: Status: ACTIVE | Noted: 2019-09-17

## 2022-08-16 PROBLEM — Z20.822 EXPOSURE TO CONFIRMED CASE OF COVID-19: Status: RESOLVED | Noted: 2021-11-30 | Resolved: 2022-08-16

## 2022-08-16 PROBLEM — Z87.898 HISTORY OF NAUSEA AND VOMITING: Status: RESOLVED | Noted: 2022-04-25 | Resolved: 2022-08-16

## 2022-08-16 RX ORDER — NITROFURANTOIN (MONOHYDRATE/MACROCRYSTALS) 25; 75 MG/1; MG/1
100 CAPSULE ORAL
Qty: 14 | Refills: 0 | Status: COMPLETED | COMMUNITY
Start: 2022-08-16 | End: 2022-08-23

## 2022-08-16 NOTE — HISTORY OF PRESENT ILLNESS
[FreeTextEntry1] : Pt has been feeling not herself and very tired for several days\par wants to go back to bed shortly after she awakens\par \par Tested negative today by [facility] nurse prior to this visit on covid antigen test.\par \par No appetite loss, no cough or cold symptoms, no n/v/d\par no one-sided weakness, no chest pain, no SOB\par \par pt prepares her own medications once a week.\par \par Also had constipation x 3 days, took sennakot yesterday 8/14\par and had a large BM this am\par

## 2022-08-16 NOTE — PHYSICAL EXAM
[Sclera] : the sclera and conjunctiva were normal [EOMI] : extraocular movements were intact [de-identified] : vitals wnl, appears tired, upset

## 2022-08-16 NOTE — ASSESSMENT
[FreeTextEntry1] : unclear why she is tired\par she does have memory loss and possibly \par not taking or preparing her medications properly\par will check labs and urine\par follow up in 2 days with [facility] mgr for another covid test\par \par

## 2022-08-17 ENCOUNTER — NON-APPOINTMENT (OUTPATIENT)
Age: 87
End: 2022-08-17

## 2022-08-29 ENCOUNTER — NON-APPOINTMENT (OUTPATIENT)
Age: 87
End: 2022-08-29

## 2022-09-02 ENCOUNTER — APPOINTMENT (OUTPATIENT)
Dept: PAIN MANAGEMENT | Facility: CLINIC | Age: 87
End: 2022-09-02

## 2022-09-06 ENCOUNTER — APPOINTMENT (OUTPATIENT)
Dept: NEPHROLOGY | Facility: CLINIC | Age: 87
End: 2022-09-06

## 2022-09-06 VITALS
SYSTOLIC BLOOD PRESSURE: 100 MMHG | HEART RATE: 69 BPM | WEIGHT: 148 LBS | DIASTOLIC BLOOD PRESSURE: 60 MMHG | BODY MASS INDEX: 27.23 KG/M2 | HEIGHT: 62 IN | OXYGEN SATURATION: 96 % | TEMPERATURE: 97.8 F

## 2022-09-06 DIAGNOSIS — E87.1 HYPO-OSMOLALITY AND HYPONATREMIA: ICD-10-CM

## 2022-09-06 PROCEDURE — 99204 OFFICE O/P NEW MOD 45 MIN: CPT

## 2022-09-06 NOTE — HISTORY OF PRESENT ILLNESS
[FreeTextEntry1] : 88 yo woman recently treated at [facility] for suspected UTI, s/p outpatient course of nitrofurantoin was complaining of more fatigue, worsening disorientation, labs done 8/16 showed sharp decline in renal function (0.87 in 6/2022--> 2.7 on 8/16/22) and worsening hyponatremia, she was admitted for management and treatment of MONICA and hyponatremia. Creatinine peaked at 2.9, on discharge at 2.4. Found to have elevated pro-BNP with EF 40%, mod-severe TR, diuresed. CT abdomen showed R sided perinephric stranding suggestive of recent inflammatory process. Today is presenting for establishing of renal care, further monitoring and management of kidney disease and hyponatremia.\par Accompanied by son and his .\par \par no changes in appetite, not on fluid restriction\par diuresed with furosemide 20 mg daily\par admits to gaining weight, shortness of breath on exertion, worsening lower extremities edema\par denies dizziness, lightheadedness, blurry vision\par no chest pain or palpitations\par no nausea, vomiting, diarrhea\par denies abdominal or flank pain\par incontinent, leaking urine, but no changes in urination\par \par blood pressure 100/60 mmHg, HR 69/min in the office\par on metoprolol 100 mg po q12hr, amlodipine 10 mg po qd, hydralazine 25 mg po q8hr, torsemide 20 mg po qd,\par sodium bicarb 650 mg tid\par \par long-standing history on hypertension, as per family being well controlled, no diabetes or prior kidney disease

## 2022-09-06 NOTE — PHYSICAL EXAM
[General Appearance - Alert] : alert [General Appearance - In No Acute Distress] : in no acute distress [General Appearance - Well Nourished] : well nourished [General Appearance - Well Developed] : well developed [Extraocular Movements] : extraocular movements were intact [] : no respiratory distress [Respiration, Rhythm And Depth] : normal respiratory rhythm and effort [Exaggerated Use Of Accessory Muscles For Inspiration] : no accessory muscle use [Heart Sounds] : normal S1 and S2 [No CVA Tenderness] : no ~M costovertebral angle tenderness [Musculoskeletal - Swelling] : no joint swelling seen [No Focal Deficits] : no focal deficits [Oriented To Time, Place, And Person] : oriented to person, place, and time [FreeTextEntry1] : ecchymosis on LE

## 2022-09-06 NOTE — REASON FOR VISIT
[Consultation] : a consultation visit [Family Member] : family member [FreeTextEntry1] : renal failure, hyponatremia

## 2022-09-06 NOTE — ASSESSMENT
[FreeTextEntry1] : 88 yo woman recently treated at [facility] for suspected UTI, s/p outpatient course of nitrofurantoin was complaining of more fatigue, worsening disorientation, labs done 8/16 showed sharp decline in renal function (0.87 in 6/2022--> 2.7 on 8/16/22) and worsening hyponatremia, she was admitted for management and treatment of MONICA and hyponatremia. Creatinine peaked at 2.9, on discharge at 2.4. Found to have elevated pro-BNP with EF 40%, mod-severe TR, diuresed. CT abdomen showed R sided perinephric stranding suggestive of recent inflammatory process. Today is presenting for establishing of renal care, further monitoring and management of kidney disease and hyponatremia.\par \par \par #MONICA - creatinine 0.87 in 6/2022--> 2.7 on 8/16/22, peaked at 2.9, on discharge at 2.4, seen by Dr Marsh inpatient.\par - likely due to ATN in setting of atrial fibrillation, underlying heart failure (EF 40%, TR)\par - obtain renal panle with electrolytes, cystatin C, uric acid, CPK, pro-BNP\par - obtain urinalysis w/ micro, urine Na, Cr, Urea, urine protein- creatinine ratio\par - fluid restriction up to 1.0 L a day\par - daily weight, strict in and outs\par - continue torsemide 20 mg po qd\par - stop sodium bicarb \par - avoid hypotension - continue metoprolol, stop amlodipine, hold on hydralazine\par - administer hydralazine for sbp >140 mmHg\par - avoid nephrotoxins/ ACE/ ARB/ NSAIDs\par - adjust meds/ ABx based on creatinine clearance  \par - follow up with Cardiology Dr Mar for management of heart failure, TR, AFib\par \par #Hyponatremia - likely hypervolemic in setting of heart and renal failure\par - obtain sOsm, uric acid, am cortisol, uOsm, Rafaela\par - thyroid panel noted, on Synthroid\par - fluid restriction to 1.0 L a day\par - continue torsemide 20 mg po qd \par \par #Hypertension - hypotensive in the office with bp 100/60 mmHg, asymptomatic \par - continue metoprolol\par - stop amlodipine for now\par - hold on hydralazine, administer hydralazine 25 mg for sbp >140 mmHg \par \par #AFib/ CHF/ TR - keep appointment with Dr Mar \par \par follow up in 2 weeks

## 2022-09-07 ENCOUNTER — TRANSCRIPTION ENCOUNTER (OUTPATIENT)
Age: 87
End: 2022-09-07

## 2022-09-13 ENCOUNTER — NON-APPOINTMENT (OUTPATIENT)
Age: 87
End: 2022-09-13

## 2022-09-13 ENCOUNTER — APPOINTMENT (OUTPATIENT)
Dept: HEART AND VASCULAR | Facility: CLINIC | Age: 87
End: 2022-09-13

## 2022-09-13 VITALS — HEART RATE: 64 BPM | SYSTOLIC BLOOD PRESSURE: 130 MMHG | DIASTOLIC BLOOD PRESSURE: 80 MMHG | HEIGHT: 62 IN

## 2022-09-13 PROCEDURE — 93000 ELECTROCARDIOGRAM COMPLETE: CPT

## 2022-09-13 PROCEDURE — 99215 OFFICE O/P EST HI 40 MIN: CPT

## 2022-09-13 NOTE — PHYSICAL EXAM
[Normal] : soft, non-tender, no masses/organomegaly, normal bowel sounds [de-identified] : tachypneic sitting in wheelchair [de-identified] : dullness to mid lung fields, faint exp wheeze [de-identified] : 2+ pitting edema b/l

## 2022-09-13 NOTE — ASSESSMENT
[FreeTextEntry1] : 87 F \par \par Systolic CHF EF 40%\par Mod/Severe TR\par Pulm HTN\par Renal Failure\par \par EKG today afib, rate 64\par ECHO: global hypokinesis, LVEF 40%, mild to moderate MR, moderate to severe TR, dilated IVC with < 50% \par collapse with respiration, consistent with elevated RA pressures, and estimated pulmonary artery  \par systolic pressure 70mmHg, consistent with severe pulmonary hypertension. \par V/Q scan unremarkable. \par \par - pt in decompensated heart failure.  Grossly overloaded, tachypneic at rest sitting in chair.  On torsemide 20mg daily since discharge.  \par - afib rate controlled on EKG\par - BP stable 130/80\par - recommend patient be admitted to hospital for IV diuresis with close monitoring of hemodynamics and renal function \par - discussed case with daughter who is with the patient at length.   Trial of outpatient diuresis not ideal given level of overload, tenuous respiratory status, renal failure, and lack of effect of additional oral diuretics in setting of extensive edema.  \par

## 2022-09-13 NOTE — REVIEW OF SYSTEMS
[Feeling Fatigued] : feeling fatigued [SOB] : shortness of breath [Dyspnea on exertion] : dyspnea during exertion [Lower Ext Edema] : lower extremity edema [Negative] : Gastrointestinal

## 2022-09-13 NOTE — HISTORY OF PRESENT ILLNESS
[FreeTextEntry1] : 87 F with recent extended hospitalization at [facility] in August 2022 for newly diagnosed systolic heart failure EF 40%, severe TR, severe pulm HTN, renal failure.  \par \par Discharged to skilled nursing center at [place].  Per daughter patient has been very sedentary, sleeping a lot of the day.   Notes intermittent sob at rest and with minimal exertion.  Also has lower extremity edema.  Has not been participating in rehab due to level of fatigue.  \par \par

## 2022-09-14 ENCOUNTER — APPOINTMENT (OUTPATIENT)
Dept: GERIATRICS | Facility: CLINIC | Age: 87
End: 2022-09-14

## 2022-09-17 ENCOUNTER — TRANSCRIPTION ENCOUNTER (OUTPATIENT)
Age: 87
End: 2022-09-17

## 2022-09-19 ENCOUNTER — NON-APPOINTMENT (OUTPATIENT)
Age: 87
End: 2022-09-19

## 2022-09-29 ENCOUNTER — NON-APPOINTMENT (OUTPATIENT)
Age: 87
End: 2022-09-29

## 2022-09-29 ENCOUNTER — APPOINTMENT (OUTPATIENT)
Dept: HEART AND VASCULAR | Facility: CLINIC | Age: 87
End: 2022-09-29
Payer: MEDICARE

## 2022-09-29 VITALS
BODY MASS INDEX: 27.23 KG/M2 | HEART RATE: 74 BPM | SYSTOLIC BLOOD PRESSURE: 110 MMHG | HEIGHT: 62 IN | WEIGHT: 148 LBS | DIASTOLIC BLOOD PRESSURE: 60 MMHG

## 2022-09-29 PROCEDURE — 93000 ELECTROCARDIOGRAM COMPLETE: CPT

## 2022-09-29 PROCEDURE — 93010 ELECTROCARDIOGRAM REPORT: CPT

## 2022-09-29 PROCEDURE — 99495 TRANSJ CARE MGMT MOD F2F 14D: CPT

## 2022-10-14 ENCOUNTER — APPOINTMENT (OUTPATIENT)
Dept: NEPHROLOGY | Facility: CLINIC | Age: 87
End: 2022-10-14

## 2022-10-27 ENCOUNTER — APPOINTMENT (OUTPATIENT)
Dept: HEART AND VASCULAR | Facility: CLINIC | Age: 87
End: 2022-10-27
Payer: MEDICARE

## 2022-10-27 VITALS
WEIGHT: 129 LBS | SYSTOLIC BLOOD PRESSURE: 140 MMHG | BODY MASS INDEX: 23.74 KG/M2 | DIASTOLIC BLOOD PRESSURE: 90 MMHG | HEIGHT: 62 IN | OXYGEN SATURATION: 96 % | HEART RATE: 72 BPM

## 2022-10-27 PROCEDURE — 93000 ELECTROCARDIOGRAM COMPLETE: CPT

## 2022-10-27 PROCEDURE — 93010 ELECTROCARDIOGRAM REPORT: CPT

## 2022-10-27 PROCEDURE — 99214 OFFICE O/P EST MOD 30 MIN: CPT

## 2022-10-27 NOTE — REVIEW OF SYSTEMS
[Feeling Fatigued] : feeling fatigued [Negative] : Gastrointestinal [SOB] : no shortness of breath [Dyspnea on exertion] : not dyspnea during exertion [Lower Ext Edema] : no extremity edema

## 2022-10-27 NOTE — ASSESSMENT
[FreeTextEntry1] : 87 F \par \par Systolic CHF EF 40% dx Aug 2022\par Right Heart Failure\par Mod/Severe TR\par Left Pleural Effusion\par Pulm HTN\par Chronic Renal Failure\par Atrial Fibrillation\par \par EKG afib, rate controlled non spec ST-T changes\par ECHO Aug 2022: global hypokinesis, LVEF 40%, mild to moderate MR, moderate to severe TR, RV dysfunction, estimated pulmonary artery systolic pressure 70mmHg, consistent with severe pulmonary hypertension. \par V/Q scan unremarkable. \par \par Recent hospitalization, discharged 9/17/2022 from [facility].\par \par - doing fantastic, lost 20 lbs of fluid, volume status dramatically improved.  Recent blood work has been ok per daughter (will obtain records)\par - repeat ECHO reassess LV and RV function, TR severity now that she is well compensated.  \par - continue current regimen for now torsemide 20mg daily and 20mg eod\par - afib rate controlled on EKG\par - BP controlled, occasional mildly elevated readings on NH BP logs.  Continue current meds: lopressor 100mg bid, hydralazine 25mg TID\par - continue aspirin 81mg, eliquis 2.5mg BID and lipitor 40mg \par - not on ace-i/arb/arni currently due to labile renal function.  pending results of echo may initiate low dose valsartan.\par - pending echo results may refer to structural team for possible TR intervention.   Discussed that if TR has improved there would be no indication for an intervention.  \par - rtc 1 month with ECHO results \par

## 2022-10-27 NOTE — HISTORY OF PRESENT ILLNESS
[FreeTextEntry1] : 87 F recently diagnosed systolic heart failure EF 40%, right heart failure, severe TR, severe pulm HTN, renal failure with two recent hospitalizations.  Diagnosis of heart failure August 2022.\par \par 9/13/22: Discharged to skilled nursing center at [place].  Per daughter patient has been very sedentary, sleeping a lot of the day.   Notes intermittent sob at rest and with minimal exertion.  Also has lower extremity edema.  Has not been participating in rehab due to level of fatigue.  \par \par 9/29/22: after last visit patient was admitted to [place] for acute heart failure 9/15-9/17/2022, received IV lasix and L thoracentesis for pleural effusion.  Since discharge doing better, breathing has improved.  She is at [facility] nursing [facility] ([place]).  Per daughter she is walking more.  Leg swelling resolved.  \par \par 10/27/22:  doing much better, more mobile, walking with walker, lost ~ 20 lbs since last visit.  SOB has resolved.  Still at skilled nursing facility, daughter may bring her back to [facility] with full time aide.  \par  \par

## 2022-10-27 NOTE — PHYSICAL EXAM
[Normal] : soft, non-tender, no masses/organomegaly, normal bowel sounds [Well Developed] : well developed [Well Nourished] : well nourished [No Acute Distress] : no acute distress [Normal Conjunctiva] : normal conjunctiva [Normal Venous Pressure] : normal venous pressure [No Carotid Bruit] : no carotid bruit [Normal S1, S2] : normal S1, S2 [No Murmur] : no murmur [No Rub] : no rub [No Gallop] : no gallop [Clear Lung Fields] : clear lung fields [Good Air Entry] : good air entry [No Respiratory Distress] : no respiratory distress  [Soft] : abdomen soft [Non Tender] : non-tender [No Masses/organomegaly] : no masses/organomegaly [Normal Bowel Sounds] : normal bowel sounds [Moves all extremities] : moves all extremities [No Focal Deficits] : no focal deficits [Normal Speech] : normal speech [No ulcers] : no ulcers [No edema] : no edema [No varicosities] : no varicosities [No chronic venous stasis changes] : no chronic venous stasis changes [No cyanosis] : no cyanosis [No rashes] : no rashes [Normal peripheral pulses] : : normal peripheral pulses [de-identified] : comfortable, no dyspnea, walking at brisk pace with walker [de-identified] : lungs clear, no rales, dullness at left base.

## 2022-10-27 NOTE — PHYSICAL EXAM
[Well Developed] : well developed [Well Nourished] : well nourished [No Acute Distress] : no acute distress [Normal Conjunctiva] : normal conjunctiva [Normal Venous Pressure] : normal venous pressure [No Carotid Bruit] : no carotid bruit [Normal S1, S2] : normal S1, S2 [No Murmur] : no murmur [No Rub] : no rub [No Gallop] : no gallop [Clear Lung Fields] : clear lung fields [Good Air Entry] : good air entry [No Respiratory Distress] : no respiratory distress  [Soft] : abdomen soft [Non Tender] : non-tender [No Masses/organomegaly] : no masses/organomegaly [Normal Bowel Sounds] : normal bowel sounds [Moves all extremities] : moves all extremities [No Focal Deficits] : no focal deficits [Normal Speech] : normal speech [No ulcers] : no ulcers [No edema] : no edema [No varicosities] : no varicosities [No chronic venous stasis changes] : no chronic venous stasis changes [No cyanosis] : no cyanosis [No rashes] : no rashes [Normal peripheral pulses] : : normal peripheral pulses [de-identified] : comfortable, no dyspnea, walking at brisk pace with walker [de-identified] : lungs clear, no rales, dullness at left base.

## 2022-10-27 NOTE — HISTORY OF PRESENT ILLNESS
[FreeTextEntry1] : 87 F recently diagnosed systolic heart failure EF 40%, right heart failure, severe TR, severe pulm HTN, renal failure with two recent hospitalizations.  Diagnosis of heart failure August 2022.\par \par 9/13/22: Discharged to skilled nursing center at [place].  Per daughter patient has been very sedentary, sleeping a lot of the day.   Notes intermittent sob at rest and with minimal exertion.  Also has lower extremity edema.  Has not been participating in rehab due to level of fatigue.  \par \par 9/29/22: after last visit patient was admitted to [place] for acute heart failure 9/15-9/17/2022, received IV lasix and L thoracentesis for pleural effusion.  Since discharge doing better, breathing has improved.  She is at [facility] nursing [facility] ([place]).  Per daughter she is walking more.  Leg swelling resolved.  \par \par 10/27/22:  doing much better, more mobile, walking with walker, lost ~ 20 lbs since last visit.  SOB has resolved.  Still at skilled nursing facility, daughter may bring her back to [facility] with full time aide.  \par  \par

## 2022-10-27 NOTE — ASSESSMENT
[FreeTextEntry1] : 87 F \par \par Systolic CHF EF 40% dx Aug 2022\par Right Heart Failure\par Mod/Severe TR\par Left Pleural Effusion\par Pulm HTN\par Chronic Renal Failure\par Atrial Fibrillation\par \par EKG afib, rate controlled non spec ST-T changes\par ECHO Aug 2022: global hypokinesis, LVEF 40%, mild to moderate MR, moderate to severe TR, RV dysfunction, estimated pulmonary artery systolic pressure 70mmHg, consistent with severe pulmonary hypertension. \par V/Q scan unremarkable. \par \par Recent hospitalization, discharged 9/17/2022 from [facility].\par \par - doing fantastic, lost 20 lbs of fluid, volume status dramatically improved.  Recent blood work has been ok per daughter (will obtain records)\par - repeat ECHO reassess LV and RV function, TR severity now that she is well compensated.  \par - continue current regimen for now torsemide 20mg daily and 20mg eod\par - afib rate controlled on EKG\par - BP controlled, occasional mildly elevated readings on NH BP logs.  Continue current meds: lopressor 100mg bid, hydralazine 25mg TID\par - continue aspirin 81mg, eliquis 2.5mg BID and lipitor 40mg \par - not on ace-i/arb/arni currently due to labile renal function.  pending results of echo may initiate low dose valsartan.\par - pending echo results may refer to structural team for possible TR intervention.   Discussed that if TR has improved there would be no indication for an intervention.  \par - rtc 1 month with ECHO results \par

## 2022-11-11 RX ORDER — NIFEDIPINE 30 MG/1
30 TABLET, FILM COATED, EXTENDED RELEASE ORAL
Qty: 90 | Refills: 3 | Status: DISCONTINUED | COMMUNITY
Start: 2021-08-09 | End: 2022-11-11

## 2022-11-11 RX ORDER — LOSARTAN POTASSIUM 50 MG/1
50 TABLET, FILM COATED ORAL DAILY
Qty: 90 | Refills: 1 | Status: DISCONTINUED | COMMUNITY
Start: 2021-08-05 | End: 2022-11-11

## 2022-11-18 NOTE — PHYSICAL EXAM
[Alert] : alert [No Acute Distress] : in no acute distress [Normal Appearance] : the appearance of the neck was normal [Supple] : the neck was supple [No Respiratory Distress] : no respiratory distress [No Acc Muscle Use] : no accessory muscle use [Respiration, Rhythm And Depth] : normal respiratory rhythm and effort [Auscultation Breath Sounds / Voice Sounds] : lungs were clear to auscultation bilaterally [Heart Rate And Rhythm] : heart rate was normal and rhythm regular [No Spinal Tenderness] : no spinal tenderness [Normal Affect] : the affect was normal [Normal Mood] : the mood was normal [Normal Oral Mucosa] : normal oral mucosa [Oropharynx] : the oropharynx was normal 18-Nov-2022 13:30

## 2022-11-21 ENCOUNTER — RESULT REVIEW (OUTPATIENT)
Age: 87
End: 2022-11-21

## 2022-12-01 ENCOUNTER — APPOINTMENT (OUTPATIENT)
Dept: HEART AND VASCULAR | Facility: CLINIC | Age: 87
End: 2022-12-01

## 2022-12-01 VITALS
DIASTOLIC BLOOD PRESSURE: 70 MMHG | HEIGHT: 62 IN | WEIGHT: 129 LBS | SYSTOLIC BLOOD PRESSURE: 140 MMHG | BODY MASS INDEX: 23.74 KG/M2

## 2022-12-01 PROCEDURE — 99214 OFFICE O/P EST MOD 30 MIN: CPT

## 2022-12-01 NOTE — ASSESSMENT
[FreeTextEntry1] : 87 F \par \par Systolic CHF EF 40-45%\par Right Heart Failure\par Mod/Severe TR improved now mild/mod\par Left Pleural Effusion\par Pulm HTN improved now PASP 37mmHg\par Chronic Renal Failure: 10/5 labs Cr 2 BUN 30, K 3.3 \par Atrial Fibrillation\par \par EKG afib, rate controlled non spec ST-T changes\par ECHO Nov 2022: EF 40-45%, decreased RV function, mod MR, mild/mod TR, PASP 37mmHg, small b/l pleural effusions, small pericardial effusion.  \par ECHO Aug 2022: global hypokinesis, LVEF 40%, mild to moderate MR, moderate to severe TR, RV dysfunction, estimated pulmonary artery systolic pressure 70mmHg, consistent with severe pulmonary hypertension. \par V/Q scan unremarkable. \par \par [place] hospitalization, discharged 9/17/2022. \par \par - repeat ECHO from November shows possible mild improvement in LV function, still with reduced RV function.  There is significant improvement in her TR and pulmonary pressures down from 70 to 37mmHg.\par - continue current regimen torsemide 20mg daily and 20mg eod, it has been working well for her.  Volume status is acceptable\par - afib rate controlled \par - BP controlled for the most part, occasional elevated readings on home logs. Current meds: lopressor 100mg bid, hydralazine 25mg TID.  Will trial low dose entresto 24/26mg BID pending stable renal function and normal K.  If BPs on lower side advised to STOP hydralazine.  \par - continue aspirin 81mg, eliquis 2.5mg BID and lipitor 40mg \par - discussed pyp scan as cardiac amyloid is in differential diagnosis given the biv failure.  Daughter would like to wait until next echo in 3 months to pursue testing.  Again discussed/confirmed that they do not want to pursue invasive coronary evaluation/interventions.  Her EKG and ECHO findings, absence of chest pain go against CAD as cause of her biv failure.   \par \par

## 2022-12-01 NOTE — PHYSICAL EXAM
[Well Developed] : well developed [Well Nourished] : well nourished [No Acute Distress] : no acute distress [Normal Conjunctiva] : normal conjunctiva [Normal Venous Pressure] : normal venous pressure [No Carotid Bruit] : no carotid bruit [Normal S1, S2] : normal S1, S2 [No Murmur] : no murmur [No Rub] : no rub [No Gallop] : no gallop [Clear Lung Fields] : clear lung fields [Good Air Entry] : good air entry [No Respiratory Distress] : no respiratory distress  [Soft] : abdomen soft [Non Tender] : non-tender [No Masses/organomegaly] : no masses/organomegaly [Normal Bowel Sounds] : normal bowel sounds [Moves all extremities] : moves all extremities [No Focal Deficits] : no focal deficits [Normal Speech] : normal speech [No ulcers] : no ulcers [No edema] : no edema [No varicosities] : no varicosities [No chronic venous stasis changes] : no chronic venous stasis changes [No cyanosis] : no cyanosis [No rashes] : no rashes [Normal peripheral pulses] : : normal peripheral pulses [de-identified] : comfortable, no dyspnea, walking at brisk pace with walker [de-identified] : lungs clear, no rales, dullness at left base.

## 2022-12-01 NOTE — HISTORY OF PRESENT ILLNESS
[FreeTextEntry1] : 87 F recently diagnosed systolic heart failure EF 40%, right heart failure, severe TR, severe pulm HTN, renal failure with two recent hospitalizations.  Diagnosis of heart failure August 2022.\par \par 9/13/22: Discharged to skilled nursing center at [place].  Per daughter patient has been very sedentary, sleeping a lot of the day.   Notes intermittent sob at rest and with minimal exertion.  Also has lower extremity edema.  Has not been participating in rehab due to level of fatigue.  \par \par 9/29/22: after last visit patient was admitted to [place] for acute heart failure 9/15-9/17/2022, received IV lasix and L thoracentesis for pleural effusion.  Since discharge doing better, breathing has improved.  She is at [facility] nursing [facility] ([place]).  Per daughter she is walking more.  Leg swelling resolved.  \par \par 10/27/22:  doing much better, more mobile, walking with walker, lost ~ 20 lbs since last visit.  SOB has resolved.  Still at skilled nursing facility, daughter may bring her back to [facility] with full time aide.  \par \par 12/1/22: doing well, no complaints, breathing is comfortable, no leg swelling, no orthopnea.  \par

## 2022-12-02 LAB
ALBUMIN SERPL ELPH-MCNC: 4.3 G/DL
ALP BLD-CCNC: 80 U/L
ALT SERPL-CCNC: 31 U/L
ANION GAP SERPL CALC-SCNC: 14 MMOL/L
AST SERPL-CCNC: 35 U/L
BILIRUB SERPL-MCNC: 0.9 MG/DL
BUN SERPL-MCNC: 28 MG/DL
CALCIUM SERPL-MCNC: 9.4 MG/DL
CHLORIDE SERPL-SCNC: 95 MMOL/L
CO2 SERPL-SCNC: 25 MMOL/L
CREAT SERPL-MCNC: 1.93 MG/DL
EGFR: 25 ML/MIN/1.73M2
GLUCOSE SERPL-MCNC: 97 MG/DL
NT-PROBNP SERPL-MCNC: 8499 PG/ML
POTASSIUM SERPL-SCNC: 4.4 MMOL/L
PROT SERPL-MCNC: 6.6 G/DL
SODIUM SERPL-SCNC: 135 MMOL/L

## 2022-12-11 ENCOUNTER — NON-APPOINTMENT (OUTPATIENT)
Age: 87
End: 2022-12-11

## 2022-12-14 DIAGNOSIS — Z51.5 ENCOUNTER FOR PALLIATIVE CARE: ICD-10-CM

## 2022-12-14 DIAGNOSIS — G89.29 OTHER CHRONIC PAIN: ICD-10-CM

## 2023-01-06 DIAGNOSIS — U07.1 COVID-19: ICD-10-CM

## 2023-01-26 ENCOUNTER — NON-APPOINTMENT (OUTPATIENT)
Age: 88
End: 2023-01-26

## 2023-01-26 ENCOUNTER — APPOINTMENT (OUTPATIENT)
Dept: HEART AND VASCULAR | Facility: CLINIC | Age: 88
End: 2023-01-26
Payer: MEDICARE

## 2023-01-26 VITALS
SYSTOLIC BLOOD PRESSURE: 88 MMHG | WEIGHT: 129 LBS | DIASTOLIC BLOOD PRESSURE: 67 MMHG | OXYGEN SATURATION: 97 % | HEART RATE: 71 BPM | HEIGHT: 62 IN | BODY MASS INDEX: 23.74 KG/M2

## 2023-01-26 PROCEDURE — 99214 OFFICE O/P EST MOD 30 MIN: CPT

## 2023-01-26 PROCEDURE — 93000 ELECTROCARDIOGRAM COMPLETE: CPT

## 2023-01-26 NOTE — PHYSICAL EXAM
[Well Developed] : well developed [Well Nourished] : well nourished [No Acute Distress] : no acute distress [Normal Conjunctiva] : normal conjunctiva [Normal Venous Pressure] : normal venous pressure [No Carotid Bruit] : no carotid bruit [Normal S1, S2] : normal S1, S2 [No Murmur] : no murmur [No Rub] : no rub [No Gallop] : no gallop [Clear Lung Fields] : clear lung fields [Good Air Entry] : good air entry [No Respiratory Distress] : no respiratory distress  [Soft] : abdomen soft [Non Tender] : non-tender [No Masses/organomegaly] : no masses/organomegaly [Normal Bowel Sounds] : normal bowel sounds [Moves all extremities] : moves all extremities [No Focal Deficits] : no focal deficits [Normal Speech] : normal speech [No ulcers] : no ulcers [No edema] : no edema [No varicosities] : no varicosities [No chronic venous stasis changes] : no chronic venous stasis changes [No cyanosis] : no cyanosis [No rashes] : no rashes [Normal peripheral pulses] : : normal peripheral pulses [de-identified] : comfortable, no dyspnea, ambulating well without assistance  [de-identified] : lungs clear, no rales, dullness at left base has resolved

## 2023-01-26 NOTE — ASSESSMENT
[FreeTextEntry1] : 87 F \par \par Systolic CHF EF 40-45% - [place] hospitalization, discharged 9/17/2022.  Medical management, no ischemic evaluation.  Patient/family do not want to pursue invasive coronary evaluation/interventions.   EKG and ECHO findings, and absence of chest pain go against CAD as cause of her biv failure.   \par Right Heart Failure\par Mod/Severe TR improved now mild/mod\par Left Pleural Effusion\par Pulm HTN improved now PASP 37mmHg\par Chronic Renal Failure: 10/5 labs Cr 2 BUN 30, K 3.3 \par Atrial Fibrillation\par \par EKG afib, rate controlled non spec ST-T changes\par ECHO Nov 2022: EF 40-45%, decreased RV function, mod MR, mild/mod TR, PASP 37mmHg, small b/l pleural effusions, small pericardial effusion.  \par ECHO Aug 2022: global hypokinesis, LVEF 40%, mild to moderate MR, moderate to severe TR, RV dysfunction, estimated pulmonary artery systolic pressure 70mmHg, consistent with severe pulmonary hypertension. \par V/Q scan unremarkable. \par \par - continue current regimen torsemide 20mg daily and 20mg eod. Volume status is very good\par - afib rate controlled \par - Continue lopressor 100mg bid and entresto 24/26mg BID.  Home bp logs reviewed, mostly normotensive, occasional readings < 100 systolic (asymptomatic). STOP HYDRALAZINE.  \par - check BNP and CMP today.  Stable renal function from 1/4/23 labs. \par - continue aspirin 81mg, eliquis 2.5mg BID and lipitor 40mg \par - discussed pyp scan as cardiac amyloid is in differential diagnosis given the biv failure.  We will hold off for now.  Repeat TTE now that she has been on beta blocker and entresto for > 3 months.    \par \par

## 2023-01-26 NOTE — HISTORY OF PRESENT ILLNESS
[FreeTextEntry1] : 87 F recently diagnosed systolic heart failure EF 40%, right heart failure, severe TR, severe pulm HTN, renal failure with two recent hospitalizations.  Diagnosis of heart failure August 2022.\par \par 9/13/22: Discharged to skilled nursing center at [place].  Per daughter patient has been very sedentary, sleeping a lot of the day.   Notes intermittent sob at rest and with minimal exertion.  Also has lower extremity edema.  Has not been participating in rehab due to level of fatigue.  \par \par 9/29/22: after last visit patient was admitted to [place] for acute heart failure 9/15-9/17/2022, received IV lasix and L thoracentesis for pleural effusion.  Since discharge doing better, breathing has improved.  She is at [facility] ([place]).  Per daughter she is walking more.  Leg swelling resolved.  \par \par 10/27/22:  doing much better, more mobile, walking with walker, lost ~ 20 lbs since last visit.  SOB has resolved.  Still at skilled nursing facility, daughter may bring her back to [facility] with full time aide.  \par \par 12/1/22: doing well, no complaints, breathing is comfortable, no leg swelling, no orthopnea.  \par \par 1/26/23:  had mild COVID infection at beginning of the month, has recovered well.  Otherwise doing quite well.  No respiratory complaints.  No leg swelling.  Had minor fall since last visit, no complications.

## 2023-01-27 LAB
ALBUMIN SERPL ELPH-MCNC: 3.7 G/DL
ALP BLD-CCNC: 165 U/L
ALT SERPL-CCNC: 25 U/L
ANION GAP SERPL CALC-SCNC: 12 MMOL/L
AST SERPL-CCNC: 28 U/L
BILIRUB SERPL-MCNC: 0.5 MG/DL
BUN SERPL-MCNC: 34 MG/DL
CALCIUM SERPL-MCNC: 9 MG/DL
CHLORIDE SERPL-SCNC: 96 MMOL/L
CO2 SERPL-SCNC: 23 MMOL/L
CREAT SERPL-MCNC: 1.95 MG/DL
EGFR: 25 ML/MIN/1.73M2
GLUCOSE SERPL-MCNC: 82 MG/DL
MAGNESIUM SERPL-MCNC: 2.1 MG/DL
NT-PROBNP SERPL-MCNC: 4176 PG/ML
POTASSIUM SERPL-SCNC: 5.2 MMOL/L
PROT SERPL-MCNC: 6.1 G/DL
SODIUM SERPL-SCNC: 132 MMOL/L

## 2023-04-04 ENCOUNTER — RESULT REVIEW (OUTPATIENT)
Age: 88
End: 2023-04-04

## 2023-04-26 RX ORDER — MAGNESIUM OXIDE 420 MG/1
420 TABLET ORAL
Qty: 90 | Refills: 3 | Status: COMPLETED | COMMUNITY
Start: 2020-12-18 | End: 2023-04-26

## 2023-04-26 RX ORDER — TRAMADOL HYDROCHLORIDE 50 MG/1
50 TABLET, COATED ORAL 3 TIMES DAILY
Qty: 45 | Refills: 0 | Status: COMPLETED | COMMUNITY
Start: 2022-12-14 | End: 2023-04-26

## 2023-04-26 RX ORDER — ACETAMINOPHEN 325 MG/1
325 TABLET, FILM COATED ORAL EVERY 6 HOURS
Qty: 80 | Refills: 11 | Status: ACTIVE | COMMUNITY
Start: 2022-06-27 | End: 1900-01-01

## 2023-04-26 RX ORDER — HYDRALAZINE HYDROCHLORIDE 25 MG/1
25 TABLET ORAL 3 TIMES DAILY
Refills: 0 | Status: COMPLETED | COMMUNITY
Start: 2022-11-11 | End: 2023-04-26

## 2023-04-26 RX ORDER — GUAIFENESIN 600 MG/1
600 TABLET, EXTENDED RELEASE ORAL
Qty: 28 | Refills: 0 | Status: COMPLETED | COMMUNITY
Start: 2023-01-06 | End: 2023-04-26

## 2023-04-26 RX ORDER — TORSEMIDE 20 MG/1
20 TABLET ORAL
Refills: 0 | Status: COMPLETED | COMMUNITY
Start: 2022-11-11 | End: 2023-04-26

## 2023-04-28 ENCOUNTER — APPOINTMENT (OUTPATIENT)
Dept: HEART AND VASCULAR | Facility: CLINIC | Age: 88
End: 2023-04-28
Payer: MEDICARE

## 2023-04-28 VITALS
WEIGHT: 121 LBS | OXYGEN SATURATION: 92 % | BODY MASS INDEX: 22.13 KG/M2 | HEART RATE: 63 BPM | DIASTOLIC BLOOD PRESSURE: 74 MMHG | SYSTOLIC BLOOD PRESSURE: 118 MMHG

## 2023-04-28 PROCEDURE — 99214 OFFICE O/P EST MOD 30 MIN: CPT

## 2023-04-28 NOTE — PHYSICAL EXAM
[Well Developed] : well developed [Well Nourished] : well nourished [No Acute Distress] : no acute distress [Normal Conjunctiva] : normal conjunctiva [Normal Venous Pressure] : normal venous pressure [No Carotid Bruit] : no carotid bruit [Normal S1, S2] : normal S1, S2 [No Murmur] : no murmur [No Rub] : no rub [No Gallop] : no gallop [Clear Lung Fields] : clear lung fields [Good Air Entry] : good air entry [No Respiratory Distress] : no respiratory distress  [Soft] : abdomen soft [Non Tender] : non-tender [No Masses/organomegaly] : no masses/organomegaly [Normal Bowel Sounds] : normal bowel sounds [Moves all extremities] : moves all extremities [Normal Speech] : normal speech [No Focal Deficits] : no focal deficits [No ulcers] : no ulcers [No edema] : no edema [No varicosities] : no varicosities [No chronic venous stasis changes] : no chronic venous stasis changes [No rashes] : no rashes [No cyanosis] : no cyanosis [Normal peripheral pulses] : : normal peripheral pulses [de-identified] : comfortable, no dyspnea, ambulating well without assistance  [de-identified] : lungs clear, no rales

## 2023-04-28 NOTE — ASSESSMENT
[FreeTextEntry1] : 88 F \par \par Systolic CHF EF improving now 50% (previously 40-45%) - [place] hospitalization, discharged 9/17/2022.  Medical management, no ischemic evaluation.  Patient/family do not want to pursue invasive coronary evaluation/interventions.   EKG and ECHO findings, and absence of chest pain go against CAD as cause of her biv failure.  Discussed pyp scan as cardiac amyloid is in differential diagnosis given the biv failure.  We will hold off for now. \par Right Heart Failure - improving RV function now only borderline dysfunction\par Mod/Severe TR improved now mild/mod\par Left Pleural Effusion - resolved\par Pulm HTN resolved now PASP 30mmHg\par Chronic Renal Failure: Jan 2023 Cr 1.95 BUN 34, K 5.2\par Atrial Fibrillation\par \par EKG afib, rate controlled non spec ST-T changes\par ECHO April 2023 with improving LVEF now 50% and RV function now borderline reduced, mild MR, mild TR, no pulm htn, PASP 30mmHg, normal IVC.  \par ECHO Nov 2022: EF 40-45%, decreased RV function, mod MR, mild/mod TR, PASP 37mmHg, small b/l pleural effusions, small pericardial effusion.  \par ECHO Aug 2022: global hypokinesis, LVEF 40%, mild to moderate MR, moderate to severe TR, RV dysfunction, estimated pulmonary artery systolic pressure 70mmHg, consistent with severe pulmonary hypertension. \par V/Q scan unremarkable. \par \par - appears well compensated, afib rate controlled \par - reviewed recent ECHO which is showing significant improvements. \par - continue current regimen torsemide 20mg daily and 20mg eod. Volume status is very good.  Repeat BMP and BNP today. \par - Continue lopressor 100mg bid and entresto 24/26mg BID.  Home bp logs reviewed in very good range. \par - continue aspirin 81mg, eliquis 2.5mg BID and lipitor 40mg

## 2023-04-28 NOTE — HISTORY OF PRESENT ILLNESS
[FreeTextEntry1] : 87 F recently diagnosed systolic heart failure EF 40%, right heart failure, severe TR, severe pulm HTN, renal failure with two recent hospitalizations.  Diagnosis of heart failure August 2022.\par \par 9/13/22: Discharged to skilled nursing center at [place].  Per daughter patient has been very sedentary, sleeping a lot of the day.   Notes intermittent sob at rest and with minimal exertion.  Also has lower extremity edema.  Has not been participating in rehab due to level of fatigue.  \par \par 9/29/22: after last visit patient was admitted to [place] for acute heart failure 9/15-9/17/2022, received IV lasix and L thoracentesis for pleural effusion.  Since discharge doing better, breathing has improved.  She is at [facility] nursing [facility] ([place]).  Per daughter she is walking more.  Leg swelling resolved.  \par \par 10/27/22:  doing much better, more mobile, walking with walker, lost ~ 20 lbs since last visit.  SOB has resolved.  Still at skilled nursing facility, daughter may bring her back to [facility] with full time aide.  \par \par 12/1/22: doing well, no complaints, breathing is comfortable, no leg swelling, no orthopnea.  \par \par 1/26/23:  had mild COVID infection at beginning of the month, has recovered well.  Otherwise doing quite well.  No respiratory complaints.  No leg swelling.  Had minor fall since last visit, no complications.  \par \par 4/28/23:  returns post testing ECHO April 2023 with improving LVEF now 50% and RV function now borderline reduced, mild MR, mild TR, no pulm htn, PASP 30mmHg, normal IVC.   Accompanied by son, says she is doing well.  She goes to the gym twice a week.  Out of [place] rehab, back to assisted living.

## 2023-08-02 DIAGNOSIS — H91.93 UNSPECIFIED HEARING LOSS, BILATERAL: ICD-10-CM

## 2023-08-03 ENCOUNTER — APPOINTMENT (OUTPATIENT)
Dept: GERIATRICS | Facility: CLINIC | Age: 88
End: 2023-08-03
Payer: MEDICARE

## 2023-08-03 PROCEDURE — 99213 OFFICE O/P EST LOW 20 MIN: CPT

## 2023-08-03 RX ORDER — MUPIROCIN 20 MG/G
2 OINTMENT TOPICAL DAILY
Qty: 1 | Refills: 0 | Status: ACTIVE | COMMUNITY
Start: 2023-08-03 | End: 1900-01-01

## 2023-08-04 ENCOUNTER — APPOINTMENT (OUTPATIENT)
Dept: HEART AND VASCULAR | Facility: CLINIC | Age: 88
End: 2023-08-04
Payer: MEDICARE

## 2023-08-04 ENCOUNTER — LABORATORY RESULT (OUTPATIENT)
Age: 88
End: 2023-08-04

## 2023-08-04 VITALS
DIASTOLIC BLOOD PRESSURE: 60 MMHG | RESPIRATION RATE: 18 BRPM | HEART RATE: 65 BPM | TEMPERATURE: 97.7 F | SYSTOLIC BLOOD PRESSURE: 110 MMHG

## 2023-08-04 VITALS
SYSTOLIC BLOOD PRESSURE: 112 MMHG | OXYGEN SATURATION: 99 % | BODY MASS INDEX: 22.68 KG/M2 | DIASTOLIC BLOOD PRESSURE: 78 MMHG | HEART RATE: 80 BPM | WEIGHT: 124 LBS

## 2023-08-04 DIAGNOSIS — I07.1 RHEUMATIC TRICUSPID INSUFFICIENCY: ICD-10-CM

## 2023-08-04 DIAGNOSIS — Z86.73 PERSONAL HISTORY OF TRANSIENT ISCHEMIC ATTACK (TIA), AND CEREBRAL INFARCTION W/OUT RESIDUAL DEFICITS: ICD-10-CM

## 2023-08-04 PROCEDURE — 93000 ELECTROCARDIOGRAM COMPLETE: CPT

## 2023-08-04 PROCEDURE — 99214 OFFICE O/P EST MOD 30 MIN: CPT

## 2023-08-04 NOTE — PHYSICAL EXAM
[Well Developed] : well developed [Well Nourished] : well nourished [No Acute Distress] : no acute distress [Normal Conjunctiva] : normal conjunctiva [Normal Venous Pressure] : normal venous pressure [No Carotid Bruit] : no carotid bruit [Normal S1, S2] : normal S1, S2 [No Murmur] : no murmur [No Rub] : no rub [No Gallop] : no gallop [Clear Lung Fields] : clear lung fields [Good Air Entry] : good air entry [No Respiratory Distress] : no respiratory distress  [Soft] : abdomen soft [Non Tender] : non-tender [No Masses/organomegaly] : no masses/organomegaly [Normal Bowel Sounds] : normal bowel sounds [Moves all extremities] : moves all extremities [No Focal Deficits] : no focal deficits [Normal Speech] : normal speech [No ulcers] : no ulcers [No edema] : no edema [No varicosities] : no varicosities [No chronic venous stasis changes] : no chronic venous stasis changes [No cyanosis] : no cyanosis [No rashes] : no rashes [Normal peripheral pulses] : : normal peripheral pulses [de-identified] : comfortable, no dyspnea, ambulating well without assistance  [de-identified] : irregular  [de-identified] : lungs clear, no rales

## 2023-08-04 NOTE — ASSESSMENT
[FreeTextEntry1] : remove polish, warm soaks, then mupirocin and bandaid nursing staff informed and orders given.

## 2023-08-04 NOTE — HISTORY OF PRESENT ILLNESS
[FreeTextEntry1] : assisted living patient here for evaluation of Pain in right great toe x 2-3 days, no fever, no falls or trauma

## 2023-08-04 NOTE — ASSESSMENT
[FreeTextEntry1] : 88 F   Systolic CHF EF improving now 50% (previously 40-45%) - [place] hospitalization, discharged 9/17/2022.  Medical management, no ischemic evaluation.  Patient/family do not want to pursue invasive coronary evaluation/interventions.   EKG and ECHO findings, and absence of chest pain go against CAD as cause of her biv failure.  Discussed pyp scan as cardiac amyloid is in differential diagnosis given the biv failure.  We will hold off for now.  Right Heart Failure - improving RV function now only borderline dysfunction Mod/Severe TR improved now mild/mod Left Pleural Effusion - resolved Pulm HTN resolved now PASP 30mmHg Chronic Renal Failure: Jan 2023 Cr 1.95 BUN 34, K 5.2 Atrial Fibrillation - rate controlled   EKG afib, PRWP rate controlled non spec T wave changes ECHO April 2023 with improving LVEF now 50% and RV function now borderline reduced, mild MR, mild TR, no pulm htn, PASP 30mmHg, normal IVC.   ECHO Nov 2022: EF 40-45%, decreased RV function, mod MR, mild/mod TR, PASP 37mmHg, small b/l pleural effusions, small pericardial effusion.   ECHO Aug 2022: global hypokinesis, LVEF 40%, mild to moderate MR, moderate to severe TR, RV dysfunction, estimated pulmonary artery systolic pressure 70mmHg, consistent with severe pulmonary hypertension.  V/Q scan unremarkable.   - well compensated, weight is stable, afib rate controlled  - continue current regimen torsemide 20mg daily and 20mg eod. Volume status is very good.  Check BMP/BNP today.   - Continue lopressor 100mg bid and entresto 24/26mg BID.    Will not uptitrate chf regimen as SBPs 110, improved LVEF now near normal .  Do not want to cause potential issue with hypotension, risk of falls as she is on asa/eliquis. - continue aspirin 81mg, eliquis 2.5mg BID and lipitor 40mg  - return in 3 months

## 2023-08-04 NOTE — PHYSICAL EXAM
[Normal] : the ears and nose were normal in appearance [No Respiratory Distress] : no respiratory distress [Normal S1, S2] : normal S1 and S2 [de-identified] : right great toe slightly tender at lateral mid toenail with mild erythema, no swelling, no joint tenderness, pt wearing heavy nailpolish

## 2023-08-04 NOTE — HISTORY OF PRESENT ILLNESS
[FreeTextEntry1] : 87 F recently diagnosed systolic heart failure EF 40%, right heart failure, severe TR, severe pulm HTN, renal failure with two recent hospitalizations.  Diagnosis of heart failure August 2022.  9/13/22: Discharged to skilled nursing center at [place].  Per daughter patient has been very sedentary, sleeping a lot of the day.   Notes intermittent sob at rest and with minimal exertion.  Also has lower extremity edema.  Has not been participating in rehab due to level of fatigue.    9/29/22: after last visit patient was admitted to [facility] for acute heart failure 9/15-9/17/2022, received IV lasix and L thoracentesis for pleural effusion.  Since discharge doing better, breathing has improved.  She is at [facility] skilled nursing [facility] ([place]).  Per daughter she is walking more.  Leg swelling resolved.    10/27/22:  doing much better, more mobile, walking with walker, lost ~ 20 lbs since last visit.  SOB has resolved.  Still at skilled nursing facility, daughter may bring her back to [facility] with full time aide.    12/1/22: doing well, no complaints, breathing is comfortable, no leg swelling, no orthopnea.    1/26/23:  had mild COVID infection at beginning of the month, has recovered well.  Otherwise doing quite well.  No respiratory complaints.  No leg swelling.  Had minor fall since last visit, no complications.    4/28/23:  returns post testing ECHO April 2023 with improving LVEF now 50% and RV function now borderline reduced, mild MR, mild TR, no pulm htn, PASP 30mmHg, normal IVC.   Accompanied by son, says she is doing well.  She goes to the gym twice a week.  Out of [facility] rehab, back to assisted living.    8/4/23: doing great, no complaints, accompanied by daughter.  no swelling/cp/sob.  living in assisted living portion of [facility] and managing well.  No changes to med.

## 2023-08-10 LAB
ANION GAP SERPL CALC-SCNC: 17 MMOL/L
BUN SERPL-MCNC: 44 MG/DL
CALCIUM SERPL-MCNC: 9.2 MG/DL
CHLORIDE SERPL-SCNC: 97 MMOL/L
CO2 SERPL-SCNC: 22 MMOL/L
CREAT SERPL-MCNC: 2.18 MG/DL
EGFR: 22 ML/MIN/1.73M2
GLUCOSE SERPL-MCNC: 82 MG/DL
NT-PROBNP SERPL-MCNC: 4722 PG/ML
POTASSIUM SERPL-SCNC: 5.4 MMOL/L
SODIUM SERPL-SCNC: 137 MMOL/L

## 2023-08-10 RX ORDER — TORSEMIDE 20 MG/1
20 TABLET ORAL
Qty: 135 | Refills: 3 | Status: DISCONTINUED | COMMUNITY
Start: 2022-11-11 | End: 2023-08-10

## 2023-08-30 ENCOUNTER — APPOINTMENT (OUTPATIENT)
Dept: GERIATRICS | Facility: ASSISTED LIVING FACILITY | Age: 88
End: 2023-08-30
Payer: MEDICARE

## 2023-08-30 DIAGNOSIS — E03.9 HYPOTHYROIDISM, UNSPECIFIED: ICD-10-CM

## 2023-08-30 PROCEDURE — 99348 HOME/RES VST EST LOW MDM 30: CPT

## 2023-09-01 VITALS
SYSTOLIC BLOOD PRESSURE: 118 MMHG | RESPIRATION RATE: 16 BRPM | TEMPERATURE: 97 F | OXYGEN SATURATION: 97 % | DIASTOLIC BLOOD PRESSURE: 70 MMHG | HEART RATE: 80 BPM

## 2023-09-01 PROBLEM — E03.9 ACQUIRED HYPOTHYROIDISM: Status: ACTIVE | Noted: 2022-03-10

## 2023-09-01 NOTE — REVIEW OF SYSTEMS
[Fever] : no fever [Chills] : no chills [Feeling Poorly] : not feeling poorly [Feeling Tired] : not feeling tired [Eyesight Problems] : no eyesight problems [Discharge From Eyes] : no purulent discharge from the eyes [Earache] : no earache [Loss Of Hearing] : hearing loss [Chest Pain] : no chest pain [Palpitations] : no palpitations [Cough] : no cough [SOB on Exertion] : no shortness of breath during exertion [Constipation] : no constipation [Diarrhea] : no diarrhea [Dysuria] : no dysuria [Incontinence] : no incontinence [Arthralgias] : arthralgias [Skin Lesions] : no skin lesions [Skin Wound] : no skin wound [Dizziness] : no dizziness [Fainting] : no fainting [Anxiety] : no anxiety [Depression] : no depression

## 2023-09-01 NOTE — ASSESSMENT
[FreeTextEntry1] : back pain is stable very well compensated at this time reviewed labs from cardiology early August will need TFT with next blood draw  patient seen on assisted living in her apartment leaving the unit can cause confusion and provoke fall   next visit will need to discuss vaccines etc - may given catch up shots at [facility] but will discuss with daughter first  Daughter Jojo in [place]

## 2023-09-01 NOTE — HISTORY OF PRESENT ILLNESS
[FreeTextEntry1] : back pain controlled  with advancing dementia she is still maintaining a level of independence and space for herself weights are stable no CARVALHO or edema noted  [No falls in past year] : Patient reported no falls in the past year [Completely Independent] : Completely independent. [Full assistance needed] : Assistance needed managing medications [] : Assistance needed managing finances. [Smoke Detector] : smoke detector [0] : 2) Feeling down, depressed, or hopeless: Not at all (0) [PHQ-2 Negative - No further assessment needed] : PHQ-2 Negative - No further assessment needed [[id number]] : 0

## 2023-09-06 ENCOUNTER — LABORATORY RESULT (OUTPATIENT)
Age: 88
End: 2023-09-06

## 2023-09-15 ENCOUNTER — RX RENEWAL (OUTPATIENT)
Age: 88
End: 2023-09-15

## 2023-09-15 RX ORDER — TORSEMIDE 20 MG/1
20 TABLET ORAL DAILY
Qty: 90 | Refills: 3 | Status: ACTIVE | COMMUNITY
Start: 2023-08-10 | End: 1900-01-01

## 2023-10-10 ENCOUNTER — RX RENEWAL (OUTPATIENT)
Age: 88
End: 2023-10-10

## 2023-10-10 RX ORDER — APIXABAN 2.5 MG/1
2.5 TABLET, FILM COATED ORAL
Qty: 180 | Refills: 3 | Status: ACTIVE | COMMUNITY
Start: 2022-11-11 | End: 1900-01-01

## 2023-10-27 ENCOUNTER — APPOINTMENT (OUTPATIENT)
Dept: GERIATRICS | Facility: CLINIC | Age: 88
End: 2023-10-27
Payer: MEDICARE

## 2023-10-27 DIAGNOSIS — R41.3 OTHER AMNESIA: ICD-10-CM

## 2023-10-27 PROCEDURE — 99349 HOME/RES VST EST MOD MDM 40: CPT

## 2023-11-05 VITALS
OXYGEN SATURATION: 96 % | DIASTOLIC BLOOD PRESSURE: 60 MMHG | RESPIRATION RATE: 18 BRPM | SYSTOLIC BLOOD PRESSURE: 114 MMHG | HEART RATE: 86 BPM | TEMPERATURE: 98 F

## 2023-11-05 PROBLEM — R41.3 MEMORY LOSS: Status: ACTIVE | Noted: 2021-12-08

## 2023-11-07 ENCOUNTER — APPOINTMENT (OUTPATIENT)
Dept: GERIATRICS | Facility: CLINIC | Age: 88
End: 2023-11-07
Payer: MEDICARE

## 2023-11-07 VITALS
WEIGHT: 132.5 LBS | OXYGEN SATURATION: 99 % | BODY MASS INDEX: 24.38 KG/M2 | SYSTOLIC BLOOD PRESSURE: 144 MMHG | HEIGHT: 62 IN | TEMPERATURE: 97 F | RESPIRATION RATE: 20 BRPM | DIASTOLIC BLOOD PRESSURE: 82 MMHG | HEART RATE: 90 BPM

## 2023-11-07 DIAGNOSIS — J06.9 ACUTE UPPER RESPIRATORY INFECTION, UNSPECIFIED: ICD-10-CM

## 2023-11-07 DIAGNOSIS — L60.0 INGROWING NAIL: ICD-10-CM

## 2023-11-07 DIAGNOSIS — N39.0 URINARY TRACT INFECTION, SITE NOT SPECIFIED: ICD-10-CM

## 2023-11-07 PROCEDURE — 99213 OFFICE O/P EST LOW 20 MIN: CPT

## 2023-11-07 RX ORDER — ACETAMINOPHEN 325 MG/1
325 TABLET, FILM COATED ORAL 3 TIMES DAILY
Qty: 21 | Refills: 0 | Status: ACTIVE | COMMUNITY
Start: 2023-11-07

## 2023-11-07 RX ORDER — FLUTICASONE PROPIONATE 50 UG/1
50 SPRAY, METERED NASAL DAILY
Qty: 1 | Refills: 0 | Status: ACTIVE | COMMUNITY
Start: 2023-11-07

## 2023-11-15 ENCOUNTER — LABORATORY RESULT (OUTPATIENT)
Age: 88
End: 2023-11-15

## 2023-11-17 ENCOUNTER — NON-APPOINTMENT (OUTPATIENT)
Age: 88
End: 2023-11-17

## 2023-11-17 ENCOUNTER — APPOINTMENT (OUTPATIENT)
Dept: HEART AND VASCULAR | Facility: CLINIC | Age: 88
End: 2023-11-17
Payer: MEDICARE

## 2023-11-17 VITALS
OXYGEN SATURATION: 94 % | SYSTOLIC BLOOD PRESSURE: 128 MMHG | HEART RATE: 68 BPM | DIASTOLIC BLOOD PRESSURE: 74 MMHG | BODY MASS INDEX: 24.14 KG/M2 | WEIGHT: 132 LBS

## 2023-11-17 DIAGNOSIS — I10 ESSENTIAL (PRIMARY) HYPERTENSION: ICD-10-CM

## 2023-11-17 PROCEDURE — 99214 OFFICE O/P EST MOD 30 MIN: CPT

## 2023-11-17 PROCEDURE — 93000 ELECTROCARDIOGRAM COMPLETE: CPT

## 2024-01-03 ENCOUNTER — LABORATORY RESULT (OUTPATIENT)
Age: 89
End: 2024-01-03

## 2024-01-25 ENCOUNTER — APPOINTMENT (OUTPATIENT)
Dept: GERIATRICS | Facility: CLINIC | Age: 89
End: 2024-01-25
Payer: MEDICARE

## 2024-01-25 VITALS
RESPIRATION RATE: 18 BRPM | TEMPERATURE: 98 F | SYSTOLIC BLOOD PRESSURE: 130 MMHG | OXYGEN SATURATION: 95 % | HEART RATE: 70 BPM | DIASTOLIC BLOOD PRESSURE: 70 MMHG

## 2024-01-25 PROCEDURE — 99214 OFFICE O/P EST MOD 30 MIN: CPT

## 2024-01-25 PROCEDURE — G2211 COMPLEX E/M VISIT ADD ON: CPT

## 2024-02-29 ENCOUNTER — APPOINTMENT (OUTPATIENT)
Dept: GERIATRICS | Facility: CLINIC | Age: 89
End: 2024-02-29

## 2024-03-14 ENCOUNTER — APPOINTMENT (OUTPATIENT)
Dept: GERIATRICS | Facility: CLINIC | Age: 89
End: 2024-03-14
Payer: MEDICARE

## 2024-03-14 DIAGNOSIS — R26.81 UNSTEADINESS ON FEET: ICD-10-CM

## 2024-03-14 DIAGNOSIS — R26.89 OTHER ABNORMALITIES OF GAIT AND MOBILITY: ICD-10-CM

## 2024-03-14 DIAGNOSIS — M48.061 SPINAL STENOSIS, LUMBAR REGION WITHOUT NEUROGENIC CLAUDICATION: ICD-10-CM

## 2024-03-14 DIAGNOSIS — I50.810 RIGHT HEART FAILURE, UNSPECIFIED: ICD-10-CM

## 2024-03-14 PROCEDURE — G2211 COMPLEX E/M VISIT ADD ON: CPT | Mod: NC

## 2024-03-14 PROCEDURE — 99443: CPT | Mod: 93

## 2024-03-18 ENCOUNTER — RX RENEWAL (OUTPATIENT)
Age: 89
End: 2024-03-18

## 2024-03-18 RX ORDER — SENNOSIDES 8.6 MG TABLETS 8.6 MG/1
8.6 TABLET ORAL AT BEDTIME
Qty: 56 | Refills: 10 | Status: ACTIVE | COMMUNITY
Start: 2022-11-11 | End: 1900-01-01

## 2024-03-18 RX ORDER — METOPROLOL TARTRATE 100 MG/1
100 TABLET, FILM COATED ORAL TWICE DAILY
Qty: 56 | Refills: 10 | Status: ACTIVE | COMMUNITY
Start: 2022-11-11 | End: 1900-01-01

## 2024-03-18 RX ORDER — LEVOTHYROXINE SODIUM 0.05 MG/1
50 TABLET ORAL
Qty: 28 | Refills: 10 | Status: ACTIVE | COMMUNITY
Start: 2021-07-05 | End: 1900-01-01

## 2024-03-18 RX ORDER — ATORVASTATIN CALCIUM 40 MG/1
40 TABLET, FILM COATED ORAL DAILY
Qty: 28 | Refills: 10 | Status: ACTIVE | COMMUNITY
Start: 2020-09-28 | End: 1900-01-01

## 2024-03-20 ENCOUNTER — RX RENEWAL (OUTPATIENT)
Age: 89
End: 2024-03-20

## 2024-03-20 PROBLEM — R26.89 IMBALANCE: Status: ACTIVE | Noted: 2024-03-14

## 2024-03-20 PROBLEM — I50.810 RIGHT HEART FAILURE: Status: ACTIVE | Noted: 2022-10-27

## 2024-03-20 PROBLEM — R26.81 GAIT INSTABILITY: Status: ACTIVE | Noted: 2024-03-14

## 2024-03-20 PROBLEM — M48.061 LUMBAR SPINAL STENOSIS: Status: ACTIVE | Noted: 2021-09-17

## 2024-03-20 NOTE — PHYSICAL EXAM
[Alert] : alert [Well Nourished] : well nourished [Well Developed] : well developed [Sclera] : the sclera and conjunctiva were normal [EOMI] : extraocular movements were intact [PERRL] : pupils were equal in size, round, and reactive to light [Normal Oral Mucosa] : normal oral mucosa [No Oral Pallor] : no oral pallor [Normal Appearance] : the appearance of the neck was normal [No Neck Mass] : no neck mass was observed [Supple] : the neck was supple [No Respiratory Distress] : no respiratory distress [No Acc Muscle Use] : no accessory muscle use [Auscultation Breath Sounds / Voice Sounds] : lungs were clear to auscultation bilaterally [Respiration, Rhythm And Depth] : normal respiratory rhythm and effort [Normal S1, S2] : normal S1 and S2 [Heart Sounds Gallop] : no gallops [Heart Rate And Rhythm] : heart rate was normal and rhythm regular [No Rubs] : no pericardial rub [Bowel Sounds] : normal bowel sounds [Abdomen Tenderness] : non-tender [Abdomen Soft] : soft [Cervical Lymph Nodes Enlarged Posterior Bilaterally] : posterior cervical [Supraclavicular Lymph Nodes Enlarged Bilaterally] : supraclavicular [Cervical Lymph Nodes Enlarged Anterior Bilaterally] : anterior cervical, supraclavicular [No CVA Tenderness] : no CVA  tenderness [No Spinal Tenderness] : no spinal tenderness [No Clubbing, Cyanosis] : no clubbing or cyanosis of the fingernails [Involuntary Movements] : no involuntary movements were seen [Motor Tone] : muscle strength and tone were normal [Normal Color / Pigmentation] : normal skin color and pigmentation [] : no rash [Normal Turgor] : normal skin turgor [Oriented To Time, Place, And Person] : oriented to person, place, and time

## 2024-03-20 NOTE — ASSESSMENT
[FreeTextEntry1] : MOCA done in past - 20/30 advancing cognitive disease requiring complete help with medication management and most iADLs but physically needing help getting lower extremity dressed but also with prompting needs reminders to shower, to get dressed, to get to appointments signs of advancing disease  plan is for PT and OT to help with ADLS and dressing and back pain next visit will update HCP and MOLST also due for 3122   daughter Jojo involved in care son Tc also more involved recently

## 2024-03-20 NOTE — REVIEW OF SYSTEMS
[Fever] : no fever [Feeling Poorly] : not feeling poorly [Chills] : no chills [Feeling Tired] : not feeling tired [Eyesight Problems] : no eyesight problems [Discharge From Eyes] : no purulent discharge from the eyes [Earache] : no earache [Loss Of Hearing] : hearing loss [Chest Pain] : no chest pain [Palpitations] : no palpitations [Cough] : no cough [SOB on Exertion] : no shortness of breath during exertion [Constipation] : no constipation [Dysuria] : no dysuria [Diarrhea] : no diarrhea [Incontinence] : no incontinence [Arthralgias] : arthralgias [Skin Lesions] : no skin lesions [Skin Wound] : no skin wound [Fainting] : no fainting [Dizziness] : no dizziness [Anxiety] : no anxiety [Depression] : no depression

## 2024-03-20 NOTE — HISTORY OF PRESENT ILLNESS
[Home] : at home, [unfilled] , at the time of the visit. [Medical Office: ([facility])___] : at the medical office located in  [Family Member] : family member [Verbal consent obtained from patient] : the patient, [unfilled] [No falls in past year] : Patient reported no falls in the past year [Independent] : transferring/mobility [Full assistance needed] : Assistance needed managing medications [] : Assistance needed managing finances. [Walker] : walker [Smoke Detector] : smoke detector [FreeTextEntry1] : needs cues and prompting to actually take the shower  [FreeTextEntry2] : needs help with socks and shoes [0] : 2) Feeling down, depressed, or hopeless: Not at all (0) [PHQ-2 Negative - No further assessment needed] : PHQ-2 Negative - No further assessment needed [[id number]] : 0

## 2024-03-27 ENCOUNTER — LABORATORY RESULT (OUTPATIENT)
Age: 89
End: 2024-03-27

## 2024-04-01 ENCOUNTER — APPOINTMENT (OUTPATIENT)
Dept: GERIATRICS | Facility: CLINIC | Age: 89
End: 2024-04-01
Payer: MEDICARE

## 2024-04-01 VITALS
OXYGEN SATURATION: 93 % | BODY MASS INDEX: 25.03 KG/M2 | TEMPERATURE: 96.9 F | WEIGHT: 136 LBS | HEIGHT: 62 IN | HEART RATE: 74 BPM | SYSTOLIC BLOOD PRESSURE: 108 MMHG | DIASTOLIC BLOOD PRESSURE: 80 MMHG

## 2024-04-01 DIAGNOSIS — I27.20 PULMONARY HYPERTENSION, UNSPECIFIED: ICD-10-CM

## 2024-04-01 PROCEDURE — 99215 OFFICE O/P EST HI 40 MIN: CPT

## 2024-04-01 PROCEDURE — G2211 COMPLEX E/M VISIT ADD ON: CPT

## 2024-04-03 ENCOUNTER — LABORATORY RESULT (OUTPATIENT)
Age: 89
End: 2024-04-03

## 2024-04-04 PROBLEM — I27.20 PULMONARY HYPERTENSION: Status: ACTIVE | Noted: 2022-09-13

## 2024-04-04 NOTE — HISTORY OF PRESENT ILLNESS
[No falls in past year] : Patient reported no falls in the past year [0] : 2) Feeling down, depressed, or hopeless: Not at all (0) [FreeTextEntry1] : Recent labs show more signs of fluid overload some CARVALHO per daughter as well  have submitted appeal to LTC as her cognitive and physical decline does warrant more help Does have chronic back pain with severe exaccerbation requiring pain management and has had epidurals in the past She has difficulty bending and putting on socks and shoes and does need help with dressing in that capacity When her body goes into heart failure her legs appear more swollen and she has more difficulty dressing the lower extremity She is also quite forgetful  She is requiring total assist with medication preparation and LPN is prepouring medication Her memory is declining and she is requiring some prompting to complete ADLs physical reminder to shower, get dressed, go to appointments (redirection)  Forms for 3122 completed today    [Independent] : transferring/mobility [Full assistance needed] : Assistance needed managing medications [] : Assistance needed managing finances. [Walker] : walker [Smoke Detector] : smoke detector [Carbon Monoxide Detector] : carbon monoxide detector [PHQ-2 Negative - No further assessment needed] : PHQ-2 Negative - No further assessment needed [[id number]] : 0

## 2024-04-04 NOTE — PHYSICAL EXAM
[Alert] : alert [Well Nourished] : well nourished [Well Developed] : well developed [Sclera] : the sclera and conjunctiva were normal [EOMI] : extraocular movements were intact [PERRL] : pupils were equal in size, round, and reactive to light [Normal Oral Mucosa] : normal oral mucosa [No Oral Pallor] : no oral pallor [Normal Appearance] : the appearance of the neck was normal [No Neck Mass] : no neck mass was observed [Supple] : the neck was supple [No Respiratory Distress] : no respiratory distress [No Acc Muscle Use] : no accessory muscle use [Respiration, Rhythm And Depth] : normal respiratory rhythm and effort [Auscultation Breath Sounds / Voice Sounds] : lungs were clear to auscultation bilaterally [Normal S1, S2] : normal S1 and S2 [Heart Rate And Rhythm] : heart rate was normal and rhythm regular [Heart Sounds Gallop] : no gallops [No Rubs] : no pericardial rub [Bowel Sounds] : normal bowel sounds [Abdomen Tenderness] : non-tender [Abdomen Soft] : soft [Cervical Lymph Nodes Enlarged Posterior Bilaterally] : posterior cervical [Cervical Lymph Nodes Enlarged Anterior Bilaterally] : anterior cervical, supraclavicular [Supraclavicular Lymph Nodes Enlarged Bilaterally] : supraclavicular [No CVA Tenderness] : no CVA  tenderness [No Spinal Tenderness] : no spinal tenderness [No Clubbing, Cyanosis] : no clubbing or cyanosis of the fingernails [Involuntary Movements] : no involuntary movements were seen [Motor Tone] : muscle strength and tone were normal [Normal Color / Pigmentation] : normal skin color and pigmentation [] : no rash [Normal Turgor] : normal skin turgor [Oriented To Time, Place, And Person] : oriented to person, place, and time

## 2024-04-04 NOTE — REVIEW OF SYSTEMS
[Fever] : no fever [Chills] : no chills [Feeling Poorly] : not feeling poorly [Feeling Tired] : not feeling tired [Eyesight Problems] : no eyesight problems [Discharge From Eyes] : no purulent discharge from the eyes [Earache] : no earache [Loss Of Hearing] : hearing loss [Chest Pain] : no chest pain [Palpitations] : no palpitations [Cough] : no cough [SOB on Exertion] : shortness of breath during exertion [Constipation] : no constipation [Diarrhea] : no diarrhea [Dysuria] : no dysuria [Incontinence] : no incontinence [Arthralgias] : arthralgias [Skin Lesions] : no skin lesions [Skin Wound] : no skin wound [Dizziness] : no dizziness [Fainting] : no fainting [Anxiety] : no anxiety [Depression] : no depression

## 2024-04-04 NOTE — ASSESSMENT
[FreeTextEntry1] : 312 done will be faxed later this week   plan is for weekly weights at assisted living BNP rising and more symptomatic nurses will communicate results with me weekly start B12 1000 mcg daily as B12 level low labs at [facility] later this week to confirm BNP and renal function not related to fasting labs prior HCP was updated and sent to [facility]  daughter Jojo at visit and very involved in care  awaiting appeal decision for LTC  consider MOLST in future

## 2024-04-09 RX ORDER — MAGNESIUM 200 MG
1000 TABLET ORAL DAILY
Qty: 30 | Refills: 11 | Status: ACTIVE | COMMUNITY
Start: 2024-04-09 | End: 1900-01-01

## 2024-05-03 ENCOUNTER — APPOINTMENT (OUTPATIENT)
Dept: HEART AND VASCULAR | Facility: CLINIC | Age: 89
End: 2024-05-03
Payer: MEDICARE

## 2024-05-03 ENCOUNTER — NON-APPOINTMENT (OUTPATIENT)
Age: 89
End: 2024-05-03

## 2024-05-03 VITALS
HEART RATE: 68 BPM | WEIGHT: 136 LBS | SYSTOLIC BLOOD PRESSURE: 118 MMHG | OXYGEN SATURATION: 96 % | BODY MASS INDEX: 24.88 KG/M2 | DIASTOLIC BLOOD PRESSURE: 80 MMHG

## 2024-05-03 DIAGNOSIS — R06.09 OTHER FORMS OF DYSPNEA: ICD-10-CM

## 2024-05-03 PROCEDURE — 93000 ELECTROCARDIOGRAM COMPLETE: CPT

## 2024-05-03 PROCEDURE — 99214 OFFICE O/P EST MOD 30 MIN: CPT

## 2024-05-03 NOTE — HISTORY OF PRESENT ILLNESS
[FreeTextEntry1] : 87F recently diagnosed systolic heart failure EF 40%, right heart failure, severe TR, severe pulm HTN, renal failure with two recent hospitalizations.  Diagnosis of heart failure August 2022.  9/13/22: Discharged to skilled nursing center at [place].  Per daughter patient has been very sedentary, sleeping a lot of the day.   Notes intermittent sob at rest and with minimal exertion.  Also has lower extremity edema.  Has not been participating in rehab due to level of fatigue.   9/29/22: after last visit patient was admitted to [facility] for acute heart failure 9/15-9/17/2022, received IV lasix and L thoracentesis for pleural effusion.  Since discharge doing better, breathing has improved.  She is at [facility] skilled nursing [facility] ([place]).  Per daughter she is walking more.  Leg swelling resolved.   10/27/22:  doing much better, more mobile, walking with walker, lost ~ 20 lbs since last visit.  SOB has resolved.  Still at skilled nursing facility, daughter may bring her back to [facility] with full time aide.   12/1/22: doing well, no complaints, breathing is comfortable, no leg swelling, no orthopnea.   1/26/23:  had mild COVID infection at beginning of the month, has recovered well.  Otherwise doing quite well.  No respiratory complaints.  No leg swelling.  Had minor fall since last visit, no complications.   4/28/23:  returns post testing ECHO April 2023 with improving LVEF now 50% and RV function now borderline reduced, mild MR, mild TR, no pulm htn, PASP 30mmHg, normal IVC.   Accompanied by son, says she is doing well.  She goes to the gym twice a week.  Out of [facility] rehab, back to assisted living.   8/4/23: doing great, no complaints, accompanied by daughter.  no swelling/cp/sob.  living in assisted living portion of [facility] and managing well.  No changes to med.  11/17/23:  recent RSV infection, no hospitalization, has made good recovery.  no cp/sob/palps/edema.  walking without limitation.   45/3/24:  has had mild decrease in her functional capacity.  notes a bit more dyspnea with exertion, needing to rest sooner than before.  no leg swelling.  no orthopnea.  no chest pain.  mild weight gain, daughter attributes this more to increased food intake.  no recent infections.  no change in meds.

## 2024-05-03 NOTE — PHYSICAL EXAM
[Well Developed] : well developed [Well Nourished] : well nourished [No Acute Distress] : no acute distress [Normal Conjunctiva] : normal conjunctiva [Normal Venous Pressure] : normal venous pressure [No Carotid Bruit] : no carotid bruit [Normal S1, S2] : normal S1, S2 [No Murmur] : no murmur [No Rub] : no rub [No Gallop] : no gallop [Clear Lung Fields] : clear lung fields [Good Air Entry] : good air entry [No Respiratory Distress] : no respiratory distress  [Soft] : abdomen soft [Non Tender] : non-tender [No Masses/organomegaly] : no masses/organomegaly [Normal Bowel Sounds] : normal bowel sounds [Moves all extremities] : moves all extremities [No Focal Deficits] : no focal deficits [Normal Speech] : normal speech [No ulcers] : no ulcers [No edema] : no edema [No varicosities] : no varicosities [No chronic venous stasis changes] : no chronic venous stasis changes [No cyanosis] : no cyanosis [No rashes] : no rashes [Normal peripheral pulses] : : normal peripheral pulses [de-identified] : comfortable, no dyspnea, ambulating well without assistance  [de-identified] : irregular  [de-identified] : faint exp wheezing bilateral bases

## 2024-05-03 NOTE — ASSESSMENT
[FreeTextEntry1] : 88 F  Systolic CHF EF improving now 50% (previously 40-45%) - [facility] hospitalization, discharged 9/17/2022. Medical management, no ischemic evaluation. Patient/family do not want to pursue invasive coronary evaluation/interventions. EKG and ECHO findings, and absence of chest pain go against CAD as cause of her biv failure. Discussed pyp scan as cardiac amyloid is in differential diagnosis given the biv failure. We will hold off for now given her significant improvement on medical therapy and preference for conservative management. Right Heart Failure - improving RV function now only borderline dysfunction Mod/Severe TR improved now mild/mod Left Pleural Effusion - resolved Pulm HTN resolved now PASP 30mmHg Chronic Renal Failure: stable  Atrial Fibrillation - rate controlled  EKG afib, PRWP rate controlled non spec ST-T changes (unchanged) ECHO April 2023 with improving LVEF now 50% and RV function now borderline reduced, mild MR, mild TR, no pulm htn, PASP 30mmHg, normal IVC. ECHO Nov 2022: EF 40-45%, decreased RV function, mod MR, mild/mod TR, PASP 37mmHg, small b/l pleural effusions, small pericardial effusion. ECHO Aug 2022: global hypokinesis, LVEF 40%, mild to moderate MR, moderate to severe TR, RV dysfunction, estimated pulmonary artery systolic pressure 70mmHg, consistent with severe pulmonary hypertension. V/Q scan unremarkable.  - relatively new decreased exertional tolerance.  Renal function and BNP are grossly unchanged.  She has very mild/faint exp wheezing at lung bases b/l.   - Will update TTE, reevaluate LV/RV function, assess intracardiac, pulmonary, and right atrial pressures.   - Obtain CXR - was only on torsemide 20mg daily, not alternating 20mg/40mg day regimen.  Recommend resumption of this regimen.   - repeat BMP/BNP in 1 month - return post testing/labs in 1 month - continue rest of meds - lopressor 100mg bid and entresto 24/26mg BID.  - continue aspirin 81mg, eliquis 2.5mg BID and lipitor 40mg

## 2024-05-06 ENCOUNTER — APPOINTMENT (OUTPATIENT)
Dept: GERIATRICS | Facility: CLINIC | Age: 89
End: 2024-05-06
Payer: MEDICARE

## 2024-05-06 VITALS
HEIGHT: 62 IN | OXYGEN SATURATION: 99 % | HEART RATE: 77 BPM | SYSTOLIC BLOOD PRESSURE: 128 MMHG | DIASTOLIC BLOOD PRESSURE: 80 MMHG | BODY MASS INDEX: 25.03 KG/M2 | WEIGHT: 136 LBS | RESPIRATION RATE: 20 BRPM | TEMPERATURE: 97 F

## 2024-05-06 DIAGNOSIS — R53.81 OTHER MALAISE: ICD-10-CM

## 2024-05-06 DIAGNOSIS — R05.1 ACUTE COUGH: ICD-10-CM

## 2024-05-06 DIAGNOSIS — R19.5 OTHER FECAL ABNORMALITIES: ICD-10-CM

## 2024-05-06 DIAGNOSIS — R53.83 OTHER MALAISE: ICD-10-CM

## 2024-05-06 PROCEDURE — 99213 OFFICE O/P EST LOW 20 MIN: CPT

## 2024-05-06 RX ORDER — GUAIFENESIN 100 MG/5ML
100 LIQUID ORAL EVERY 6 HOURS
Qty: 1 | Refills: 0 | Status: ACTIVE | COMMUNITY
Start: 2024-05-06 | End: 1900-01-01

## 2024-05-06 RX ORDER — SACUBITRIL AND VALSARTAN 24; 26 MG/1; MG/1
24-26 TABLET, FILM COATED ORAL TWICE DAILY
Qty: 180 | Refills: 0 | Status: ACTIVE | COMMUNITY
Start: 2022-12-02 | End: 1900-01-01

## 2024-05-06 NOTE — REASON FOR VISIT
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

## 2024-05-06 NOTE — REVIEW OF SYSTEMS
[As Noted in HPI] : as noted in HPI [Cough] : cough [Negative] : Cardiovascular [FreeTextEntry6] : occasional cough

## 2024-05-06 NOTE — PHYSICAL EXAM
[Alert] : alert [No Acute Distress] : in no acute distress [Normal Outer Ear/Nose] : the ears and nose were normal in appearance [Normal Appearance] : the appearance of the neck was normal [Supple] : the neck was supple [No Respiratory Distress] : no respiratory distress [No Acc Muscle Use] : no accessory muscle use [Respiration, Rhythm And Depth] : normal respiratory rhythm and effort [Auscultation Breath Sounds / Voice Sounds] : lungs were clear to auscultation bilaterally [Normal S1, S2] : normal S1 and S2 [Heart Rate And Rhythm] : heart rate was normal and rhythm regular [Bowel Sounds] : normal bowel sounds [Abdomen Tenderness] : non-tender [Abdomen Soft] : soft [Normal Color / Pigmentation] : normal skin color and pigmentation [Normal Turgor] : normal skin turgor [No Focal Deficits] : no focal deficits [Normal Affect] : the affect was normal [Normal Mood] : the mood was normal

## 2024-05-07 DIAGNOSIS — J10.1 INFLUENZA DUE TO OTHER IDENTIFIED INFLUENZA VIRUS WITH OTHER RESPIRATORY MANIFESTATIONS: ICD-10-CM

## 2024-05-07 RX ORDER — OSELTAMIVIR PHOSPHATE 30 MG/1
30 CAPSULE ORAL DAILY
Qty: 5 | Refills: 0 | Status: ACTIVE | COMMUNITY
Start: 2024-05-07 | End: 1900-01-01

## 2024-05-22 ENCOUNTER — LABORATORY RESULT (OUTPATIENT)
Age: 89
End: 2024-05-22

## 2024-05-31 ENCOUNTER — APPOINTMENT (OUTPATIENT)
Dept: GERIATRICS | Facility: CLINIC | Age: 89
End: 2024-05-31
Payer: MEDICARE

## 2024-05-31 VITALS
SYSTOLIC BLOOD PRESSURE: 106 MMHG | TEMPERATURE: 98.6 F | BODY MASS INDEX: 25.06 KG/M2 | OXYGEN SATURATION: 98 % | WEIGHT: 137 LBS | HEART RATE: 64 BPM | DIASTOLIC BLOOD PRESSURE: 68 MMHG

## 2024-05-31 DIAGNOSIS — R41.89 OTHER SYMPTOMS AND SIGNS INVOLVING COGNITIVE FUNCTIONS AND AWARENESS: ICD-10-CM

## 2024-05-31 DIAGNOSIS — N17.9 ACUTE KIDNEY FAILURE, UNSPECIFIED: ICD-10-CM

## 2024-05-31 DIAGNOSIS — N18.4 CHRONIC KIDNEY DISEASE, STAGE 4 (SEVERE): ICD-10-CM

## 2024-05-31 DIAGNOSIS — I63.9 CEREBRAL INFARCTION, UNSPECIFIED: ICD-10-CM

## 2024-05-31 DIAGNOSIS — I48.91 UNSPECIFIED ATRIAL FIBRILLATION: ICD-10-CM

## 2024-05-31 DIAGNOSIS — I50.9 HEART FAILURE, UNSPECIFIED: ICD-10-CM

## 2024-05-31 PROCEDURE — 99214 OFFICE O/P EST MOD 30 MIN: CPT

## 2024-05-31 PROCEDURE — G2211 COMPLEX E/M VISIT ADD ON: CPT

## 2024-05-31 NOTE — HISTORY OF PRESENT ILLNESS
[No falls in past year] : Patient reported no falls in the past year [0] : 2) Feeling down, depressed, or hopeless: Not at all (0) [FreeTextEntry1] : 89F CHF, pHTN, cognitive impairment presenting for follow up with daughter Jojo   Does have chronic back pain with severe exaccerbation requiring pain management and has had epidurals in the past She has difficulty bending and putting on socks and shoes and does need help with dressing in that capacity When her body goes into heart failure her legs appear more swollen and she has more difficulty dressing the lower extremity She is also quite forgetful  She is requiring total assist with medication preparation and LPN is prepouring medication Her memory is declining and she is requiring some prompting to complete ADLs physical reminder to shower, get dressed, go to appointments (redirection)  Was treated for Flu and recovered well      [Independent] : transferring/mobility [Full assistance needed] : Assistance needed managing medications [] : Assistance needed managing finances. [Walker] : walker [Smoke Detector] : smoke detector [Carbon Monoxide Detector] : carbon monoxide detector [PHQ-2 Negative - No further assessment needed] : PHQ-2 Negative - No further assessment needed [[id number]] : 0

## 2024-05-31 NOTE — ASSESSMENT
[FreeTextEntry1] : next visit [facility] and can complete MOLST on that day HCP was recently updated as  now with advancing dementia children are listed as HCP reviewed labs stable B12 high continue to watch for now can complete labs at next visit if not already done by specialists MOLST with [facility] next visit as otherwise stable approved for LTC services   called [facility] for med list and consult sheet,  daughter Jojo at visit and very involved in care

## 2024-07-29 ENCOUNTER — RX RENEWAL (OUTPATIENT)
Age: 89
End: 2024-07-29

## 2024-08-08 ENCOUNTER — LABORATORY RESULT (OUTPATIENT)
Age: 89
End: 2024-08-08

## 2024-08-14 ENCOUNTER — LABORATORY RESULT (OUTPATIENT)
Age: 89
End: 2024-08-14

## 2024-08-15 ENCOUNTER — LABORATORY RESULT (OUTPATIENT)
Age: 89
End: 2024-08-15

## 2024-08-16 ENCOUNTER — APPOINTMENT (OUTPATIENT)
Dept: GERIATRICS | Facility: CLINIC | Age: 89
End: 2024-08-16
Payer: MEDICARE

## 2024-08-16 VITALS
WEIGHT: 139 LBS | DIASTOLIC BLOOD PRESSURE: 82 MMHG | BODY MASS INDEX: 25.58 KG/M2 | OXYGEN SATURATION: 98 % | TEMPERATURE: 98.1 F | HEART RATE: 64 BPM | HEIGHT: 62 IN | SYSTOLIC BLOOD PRESSURE: 130 MMHG

## 2024-08-16 DIAGNOSIS — I50.9 HEART FAILURE, UNSPECIFIED: ICD-10-CM

## 2024-08-16 DIAGNOSIS — N18.4 CHRONIC KIDNEY DISEASE, STAGE 4 (SEVERE): ICD-10-CM

## 2024-08-16 DIAGNOSIS — I63.9 CEREBRAL INFARCTION, UNSPECIFIED: ICD-10-CM

## 2024-08-16 DIAGNOSIS — R41.89 OTHER SYMPTOMS AND SIGNS INVOLVING COGNITIVE FUNCTIONS AND AWARENESS: ICD-10-CM

## 2024-08-16 DIAGNOSIS — I48.91 UNSPECIFIED ATRIAL FIBRILLATION: ICD-10-CM

## 2024-08-16 DIAGNOSIS — E03.9 HYPOTHYROIDISM, UNSPECIFIED: ICD-10-CM

## 2024-08-16 PROCEDURE — G0439: CPT

## 2024-08-16 NOTE — ASSESSMENT
[FreeTextEntry1] : B12 therapeutic reviewed all labs stable  daughter Jojo at visit and very involved in care

## 2024-08-16 NOTE — HISTORY OF PRESENT ILLNESS
[PMH Reviewed and Updated] : past medical history reviewed and updated [PSH Reviewed and Updated] : past surgical history reviewed and updated [Family History Reviewed and Updated] : family history reviewed and updated [Medication and Allergies Reconciled] : medication and allergies reconciled [Over the Past 2 Weeks, Have You Felt Down, Depressed, or Hopeless?] : 1.) Over the past 2 weeks, have you felt down, depressed, or hopeless? No [Over the Past 2 Weeks, Have You Felt Little Interest or Pleasure Doing Things?] : 2.) Over the past 2 weeks, have you felt little interest or pleasure doing things? No [___ Daughters] : [unfilled] daughter(s) [Retired] : retired from work [None] : The patient has no concerns about alcohol abuse [Never] : has never used illicit drugs [Compliant with medications] : compliant with medications [Unable To Manage Meds] : ability to manage ~his/her~ medications [Adequate] : adequate [Children] : children [Needs some help with ADLs] : need some help with ADLs [Does not drive] : does not drive [No history of falls] : no history of falls [Seatbelts] : seatbelts [Smoke Detectors] : smoke detectors [FreeTextEntry1] : 89F CHF, pHTN, cognitive impairment presenting for follow up with daughter Jojo   Does have chronic back pain with severe exaccerbation requiring pain management and has had epidurals in the past She has difficulty bending and putting on socks and shoes and does need help with dressing in that capacity When her body goes into heart failure her legs appear more swollen and she has more difficulty dressing the lower extremity She is also quite forgetful  She is requiring total assist with medication preparation and LPN is prepouring medication Her memory is declining and she is requiring some prompting to complete ADLs physical reminder to shower, get dressed, go to appointments (redirection)       [No falls in past year] : Patient reported no falls in the past year [Completely Independent] : Completely independent. [Full assistance needed] : Assistance needed managing medications [] : Assistance needed managing finances. [Walker] : walker [Smoke Detector] : smoke detector [Carbon Monoxide Detector] : carbon monoxide detector [NO] : No [0] : 2) Feeling down, depressed, or hopeless: Not at all (0) [PHQ-2 Negative - No further assessment needed] : PHQ-2 Negative - No further assessment needed [[id number]] : 0

## 2024-08-29 DIAGNOSIS — R06.02 SHORTNESS OF BREATH: ICD-10-CM

## 2024-09-03 ENCOUNTER — RX RENEWAL (OUTPATIENT)
Age: 89
End: 2024-09-03

## 2024-09-11 ENCOUNTER — RESULT REVIEW (OUTPATIENT)
Age: 89
End: 2024-09-11

## 2024-09-17 ENCOUNTER — RX RENEWAL (OUTPATIENT)
Age: 89
End: 2024-09-17

## 2024-09-17 RX ORDER — SACUBITRIL AND VALSARTAN 24; 26 MG/1; MG/1
24-26 TABLET, FILM COATED ORAL
Qty: 180 | Refills: 3 | Status: ACTIVE | COMMUNITY
Start: 2024-09-17 | End: 1900-01-01

## 2024-09-27 ENCOUNTER — APPOINTMENT (OUTPATIENT)
Dept: HEART AND VASCULAR | Facility: CLINIC | Age: 89
End: 2024-09-27
Payer: MEDICARE

## 2024-09-27 ENCOUNTER — NON-APPOINTMENT (OUTPATIENT)
Age: 89
End: 2024-09-27

## 2024-09-27 VITALS
OXYGEN SATURATION: 93 % | HEART RATE: 76 BPM | BODY MASS INDEX: 25.21 KG/M2 | DIASTOLIC BLOOD PRESSURE: 70 MMHG | HEIGHT: 62 IN | SYSTOLIC BLOOD PRESSURE: 104 MMHG | WEIGHT: 137 LBS

## 2024-09-27 DIAGNOSIS — I48.91 UNSPECIFIED ATRIAL FIBRILLATION: ICD-10-CM

## 2024-09-27 DIAGNOSIS — I10 ESSENTIAL (PRIMARY) HYPERTENSION: ICD-10-CM

## 2024-09-27 DIAGNOSIS — Z86.73 PERSONAL HISTORY OF TRANSIENT ISCHEMIC ATTACK (TIA), AND CEREBRAL INFARCTION W/OUT RESIDUAL DEFICITS: ICD-10-CM

## 2024-09-27 DIAGNOSIS — R41.89 OTHER SYMPTOMS AND SIGNS INVOLVING COGNITIVE FUNCTIONS AND AWARENESS: ICD-10-CM

## 2024-09-27 DIAGNOSIS — I50.9 HEART FAILURE, UNSPECIFIED: ICD-10-CM

## 2024-09-27 DIAGNOSIS — N18.4 CHRONIC KIDNEY DISEASE, STAGE 4 (SEVERE): ICD-10-CM

## 2024-09-27 PROCEDURE — 93000 ELECTROCARDIOGRAM COMPLETE: CPT

## 2024-09-27 PROCEDURE — 99214 OFFICE O/P EST MOD 30 MIN: CPT

## 2024-09-27 PROCEDURE — G2211 COMPLEX E/M VISIT ADD ON: CPT

## 2024-09-27 NOTE — ASSESSMENT
[FreeTextEntry1] : 89F  Systolic CHF EF improved now 50-55% (previously 40-45%) - initial [place] hospitalization, discharged 9/17/2022. Medical management, no ischemic evaluation. Patient/family do not want to pursue invasive coronary evaluation/interventions. EKG and ECHO findings, and absence of chest pain go against CAD as cause of her biv failure. Discussed pyp scan as cardiac amyloid is in differential diagnosis given the biv failure. We will hold off for now given her significant improvement on medical therapy and preference for conservative management. Right Heart Failure - improving RV function now only borderline dysfunction Mod/Severe TR improved now mild Left Pleural Effusion - resolved Pulm HTN resolved now normal PA/RA pressures Chronic Renal Failure: stable  Atrial Fibrillation - rate controlled  EKG afib,rate controlled non spec T wave changes (unchanged) ECHO April 2023 with improving LVEF now 50% and RV function now borderline reduced, mild MR, mild TR, no pulm htn, PASP 30mmHg, normal IVC. ECHO Nov 2022: EF 40-45%, decreased RV function, mod MR, mild/mod TR, PASP 37mmHg, small b/l pleural effusions, small pericardial effusion. ECHO Aug 2022: global hypokinesis, LVEF 40%, mild to moderate MR, moderate to severe TR, RV dysfunction, estimated pulmonary artery systolic pressure 70mmHg, consistent with severe pulmonary hypertension. V/Q scan unremarkable.  - recent TTE Sep 2024 reviewed - EF 50-55%, borderline RV dysfunction with normal PA and RA pressures.  No signs of volume overload.   Well compensated.  Recent labs reviewed, stable kidney function and BNP. - continue current HF/diuretic regimen: lopressor 100mg bid and entresto 24/26mg BID.  Will clarify dose of torsemide at [facility], appears to be taking torsemide 20mg daily - continue aspirin 81mg, eliquis 2.5mg BID and lipitor 40mg.  No bleeding events.   - can return in six months

## 2024-09-27 NOTE — CARDIOLOGY SUMMARY
[de-identified] : Echo 9/11/24: 1. Left ventricular cavity is normal in size. Left ventricular wall thickness is normal. Left ventricular systolic function is borderline mildly decreased with an ejection fraction visually estimated at 50-55%. Mild global left ventricular hypokinesis. 2. Unable to assess left ventricular diastolic function due to insufficient data. 3. Mild left ventricular hypertrophy. 4. Mildly enlarged right ventricular cavity size, with normal wall thickness, and borderline reduced right ventricular systolic function. 5. Left atrium is severely dilated. 6. The right atrium is dilated. 7. Mild mitral regurgitation. 8. Mild tricuspid regurgitation. 9. Estimated pulmonary artery systolic pressure is 27 mmHg, consistent with normal pulmonary artery pressure. 10. Low/normal right atrial pressures. IVC fully collapsing. 11. Trace pericardial effusion. 12. Compared to prior study 4/52023 no significant change.

## 2024-09-27 NOTE — HISTORY OF PRESENT ILLNESS
[FreeTextEntry1] : 87F recently diagnosed systolic heart failure EF 40%, right heart failure, severe TR, severe pulm HTN, renal failure with two recent hospitalizations.  Diagnosis of heart failure August 2022.  9/13/22: Discharged to skilled nursing center at [place].  Per daughter patient has been very sedentary, sleeping a lot of the day.   Notes intermittent sob at rest and with minimal exertion.  Also has lower extremity edema.  Has not been participating in rehab due to level of fatigue.   9/29/22: after last visit patient was admitted to [place] for acute heart failure 9/15-9/17/2022, received IV lasix and L thoracentesis for pleural effusion.  Since discharge doing better, breathing has improved.  She is at [place] skilled nursing [facility] ([place]).  Per daughter she is walking more.  Leg swelling resolved.   10/27/22:  doing much better, more mobile, walking with walker, lost ~ 20 lbs since last visit.  SOB has resolved.  Still at skilled nursing facility, daughter may bring her back to [facility] with full time aide.   12/1/22: doing well, no complaints, breathing is comfortable, no leg swelling, no orthopnea.   1/26/23:  had mild COVID infection at beginning of the month, has recovered well.  Otherwise doing quite well.  No respiratory complaints.  No leg swelling.  Had minor fall since last visit, no complications.   4/28/23:  returns post testing ECHO April 2023 with improving LVEF now 50% and RV function now borderline reduced, mild MR, mild TR, no pulm htn, PASP 30mmHg, normal IVC.   Accompanied by son, says she is doing well.  She goes to the gym twice a week.  Out of [place] rehab, back to assisted living.   8/4/23: doing great, no complaints, accompanied by daughter.  no swelling/cp/sob.  living in assisted living portion of [place] and managing well.  No changes to med.  11/17/23:  recent RSV infection, no hospitalization, has made good recovery.  no cp/sob/palps/edema.  walking without limitation.   5/3/24:  has had mild decrease in her functional capacity.  notes a bit more dyspnea with exertion, needing to rest sooner than before.  no leg swelling.  no orthopnea.  no chest pain.  mild weight gain, daughter attributes this more to increased food intake.  no recent infections.  no change in meds.  9/27/24: no new dyspnea/edema/weight gain.  daughter just notes fatigue.  no change in meds.

## 2024-09-27 NOTE — PHYSICAL EXAM
[Well Developed] : well developed [Well Nourished] : well nourished [No Acute Distress] : no acute distress [Normal Conjunctiva] : normal conjunctiva [Normal Venous Pressure] : normal venous pressure [No Carotid Bruit] : no carotid bruit [Normal S1, S2] : normal S1, S2 [No Murmur] : no murmur [No Rub] : no rub [No Gallop] : no gallop [Clear Lung Fields] : clear lung fields [Good Air Entry] : good air entry [No Respiratory Distress] : no respiratory distress  [Soft] : abdomen soft [Non Tender] : non-tender [No Masses/organomegaly] : no masses/organomegaly [Normal Bowel Sounds] : normal bowel sounds [Moves all extremities] : moves all extremities [No Focal Deficits] : no focal deficits [Normal Speech] : normal speech [No ulcers] : no ulcers [No edema] : no edema [No varicosities] : no varicosities [No chronic venous stasis changes] : no chronic venous stasis changes [No cyanosis] : no cyanosis [No rashes] : no rashes [Normal peripheral pulses] : : normal peripheral pulses [de-identified] : comfortable, no dyspnea, ambulating well without assistance  [de-identified] : irregular  [de-identified] : lungs are clear, resolved wheezing at bases

## 2025-02-06 ENCOUNTER — RX RENEWAL (OUTPATIENT)
Age: 89
End: 2025-02-06

## 2025-02-12 ENCOUNTER — LABORATORY RESULT (OUTPATIENT)
Age: 89
End: 2025-02-12

## 2025-02-13 ENCOUNTER — APPOINTMENT (OUTPATIENT)
Dept: GERIATRICS | Facility: CLINIC | Age: 89
End: 2025-02-13
Payer: MEDICARE

## 2025-02-13 VITALS
WEIGHT: 141 LBS | TEMPERATURE: 97.9 F | BODY MASS INDEX: 25.95 KG/M2 | OXYGEN SATURATION: 99 % | DIASTOLIC BLOOD PRESSURE: 66 MMHG | HEART RATE: 71 BPM | HEIGHT: 62 IN | SYSTOLIC BLOOD PRESSURE: 120 MMHG

## 2025-02-13 DIAGNOSIS — I63.9 CEREBRAL INFARCTION, UNSPECIFIED: ICD-10-CM

## 2025-02-13 DIAGNOSIS — E03.9 HYPOTHYROIDISM, UNSPECIFIED: ICD-10-CM

## 2025-02-13 DIAGNOSIS — I50.9 HEART FAILURE, UNSPECIFIED: ICD-10-CM

## 2025-02-13 DIAGNOSIS — I48.91 UNSPECIFIED ATRIAL FIBRILLATION: ICD-10-CM

## 2025-02-13 PROCEDURE — G2211 COMPLEX E/M VISIT ADD ON: CPT

## 2025-02-13 PROCEDURE — 99214 OFFICE O/P EST MOD 30 MIN: CPT

## 2025-02-13 RX ORDER — CHOLECALCIFEROL (VITAMIN D3) 25 MCG
25 MCG TABLET ORAL DAILY
Qty: 90 | Refills: 3 | Status: ACTIVE | COMMUNITY
Start: 2025-02-13 | End: 1900-01-01

## 2025-03-27 ENCOUNTER — RX RENEWAL (OUTPATIENT)
Age: 89
End: 2025-03-27

## 2025-03-28 ENCOUNTER — NON-APPOINTMENT (OUTPATIENT)
Age: 89
End: 2025-03-28

## 2025-03-28 ENCOUNTER — APPOINTMENT (OUTPATIENT)
Dept: HEART AND VASCULAR | Facility: CLINIC | Age: 89
End: 2025-03-28
Payer: MEDICARE

## 2025-03-28 VITALS
BODY MASS INDEX: 25.61 KG/M2 | OXYGEN SATURATION: 100 % | DIASTOLIC BLOOD PRESSURE: 70 MMHG | HEART RATE: 71 BPM | SYSTOLIC BLOOD PRESSURE: 120 MMHG | WEIGHT: 140 LBS

## 2025-03-28 DIAGNOSIS — Z86.73 PERSONAL HISTORY OF TRANSIENT ISCHEMIC ATTACK (TIA), AND CEREBRAL INFARCTION W/OUT RESIDUAL DEFICITS: ICD-10-CM

## 2025-03-28 DIAGNOSIS — I48.91 UNSPECIFIED ATRIAL FIBRILLATION: ICD-10-CM

## 2025-03-28 DIAGNOSIS — I50.810 RIGHT HEART FAILURE, UNSPECIFIED: ICD-10-CM

## 2025-03-28 DIAGNOSIS — I10 ESSENTIAL (PRIMARY) HYPERTENSION: ICD-10-CM

## 2025-03-28 DIAGNOSIS — N18.4 CHRONIC KIDNEY DISEASE, STAGE 4 (SEVERE): ICD-10-CM

## 2025-03-28 DIAGNOSIS — I50.9 HEART FAILURE, UNSPECIFIED: ICD-10-CM

## 2025-03-28 DIAGNOSIS — I27.20 PULMONARY HYPERTENSION, UNSPECIFIED: ICD-10-CM

## 2025-03-28 DIAGNOSIS — I07.1 RHEUMATIC TRICUSPID INSUFFICIENCY: ICD-10-CM

## 2025-03-28 PROCEDURE — 93000 ELECTROCARDIOGRAM COMPLETE: CPT

## 2025-03-28 PROCEDURE — G2211 COMPLEX E/M VISIT ADD ON: CPT

## 2025-03-28 PROCEDURE — 99214 OFFICE O/P EST MOD 30 MIN: CPT

## 2025-04-04 ENCOUNTER — RX RENEWAL (OUTPATIENT)
Age: 89
End: 2025-04-04

## 2025-04-10 ENCOUNTER — APPOINTMENT (OUTPATIENT)
Dept: GERIATRICS | Facility: CLINIC | Age: 89
End: 2025-04-10

## 2025-04-10 VITALS
BODY MASS INDEX: 25.4 KG/M2 | OXYGEN SATURATION: 98 % | HEART RATE: 61 BPM | WEIGHT: 138 LBS | HEIGHT: 62 IN | TEMPERATURE: 97.3 F | SYSTOLIC BLOOD PRESSURE: 124 MMHG | DIASTOLIC BLOOD PRESSURE: 74 MMHG

## 2025-04-10 DIAGNOSIS — J10.1 INFLUENZA DUE TO OTHER IDENTIFIED INFLUENZA VIRUS WITH OTHER RESPIRATORY MANIFESTATIONS: ICD-10-CM

## 2025-04-10 DIAGNOSIS — R19.5 OTHER FECAL ABNORMALITIES: ICD-10-CM

## 2025-04-10 DIAGNOSIS — Z78.9 OTHER SPECIFIED HEALTH STATUS: ICD-10-CM

## 2025-04-10 DIAGNOSIS — R41.3 OTHER AMNESIA: ICD-10-CM

## 2025-04-10 DIAGNOSIS — Z51.5 ENCOUNTER FOR PALLIATIVE CARE: ICD-10-CM

## 2025-04-10 DIAGNOSIS — I50.9 HEART FAILURE, UNSPECIFIED: ICD-10-CM

## 2025-04-10 DIAGNOSIS — R05.1 ACUTE COUGH: ICD-10-CM

## 2025-04-10 DIAGNOSIS — I48.91 UNSPECIFIED ATRIAL FIBRILLATION: ICD-10-CM

## 2025-04-10 DIAGNOSIS — R53.83 OTHER MALAISE: ICD-10-CM

## 2025-04-10 DIAGNOSIS — R06.02 SHORTNESS OF BREATH: ICD-10-CM

## 2025-04-10 DIAGNOSIS — R53.81 OTHER MALAISE: ICD-10-CM

## 2025-04-10 PROCEDURE — 99214 OFFICE O/P EST MOD 30 MIN: CPT

## 2025-06-11 ENCOUNTER — LABORATORY RESULT (OUTPATIENT)
Age: 89
End: 2025-06-11

## 2025-06-16 ENCOUNTER — APPOINTMENT (OUTPATIENT)
Dept: GERIATRICS | Facility: CLINIC | Age: 89
End: 2025-06-16
Payer: MEDICARE

## 2025-06-16 ENCOUNTER — RX RENEWAL (OUTPATIENT)
Age: 89
End: 2025-06-16

## 2025-06-16 VITALS
OXYGEN SATURATION: 97 % | RESPIRATION RATE: 16 BRPM | SYSTOLIC BLOOD PRESSURE: 120 MMHG | BODY MASS INDEX: 25.24 KG/M2 | HEART RATE: 80 BPM | WEIGHT: 138 LBS | DIASTOLIC BLOOD PRESSURE: 64 MMHG

## 2025-06-16 PROBLEM — R73.01 ELEVATED FASTING BLOOD SUGAR: Status: ACTIVE | Noted: 2025-06-16

## 2025-06-16 PROBLEM — N39.0 ACUTE LOWER UTI: Status: ACTIVE | Noted: 2022-08-16

## 2025-06-16 PROBLEM — H04.123 DRY EYES: Status: ACTIVE | Noted: 2025-06-16

## 2025-06-16 PROCEDURE — 99214 OFFICE O/P EST MOD 30 MIN: CPT

## 2025-06-16 PROCEDURE — G2211 COMPLEX E/M VISIT ADD ON: CPT

## 2025-06-16 RX ORDER — POLYETHYLENE GLYCOL 400 AND PROPYLENE GLYCOL 4; 3 MG/ML; MG/ML
0.4-0.3 SOLUTION/ DROPS OPHTHALMIC 4 TIMES DAILY
Qty: 1 | Refills: 11 | Status: ACTIVE | COMMUNITY
Start: 2025-06-16 | End: 1900-01-01

## 2025-06-16 RX ORDER — NITROFURANTOIN (MONOHYDRATE/MACROCRYSTALS) 25; 75 MG/1; MG/1
100 CAPSULE ORAL
Qty: 14 | Refills: 0 | Status: COMPLETED | COMMUNITY
Start: 1900-01-01 | End: 2025-06-23

## 2025-06-23 PROBLEM — M25.561 CHRONIC PAIN OF BOTH KNEES: Status: ACTIVE | Noted: 2025-06-23

## 2025-07-29 ENCOUNTER — RX RENEWAL (OUTPATIENT)
Age: 89
End: 2025-07-29